# Patient Record
Sex: FEMALE | Race: WHITE | NOT HISPANIC OR LATINO | Employment: UNEMPLOYED | URBAN - METROPOLITAN AREA
[De-identification: names, ages, dates, MRNs, and addresses within clinical notes are randomized per-mention and may not be internally consistent; named-entity substitution may affect disease eponyms.]

---

## 2023-02-21 LAB
ALBUMIN (G/DL) IN SER/PLAS: 4.5 G/DL (ref 3.4–5)
ANION GAP IN SER/PLAS: 11 MMOL/L (ref 10–20)
CALCIDIOL (25 OH VITAMIN D3) (NG/ML) IN SER/PLAS: 28 NG/ML
CALCIUM (MG/DL) IN SER/PLAS: 10.4 MG/DL (ref 8.6–10.6)
CARBON DIOXIDE, TOTAL (MMOL/L) IN SER/PLAS: 28 MMOL/L (ref 21–32)
CHLORIDE (MMOL/L) IN SER/PLAS: 105 MMOL/L (ref 98–107)
CREATININE (MG/DL) IN SER/PLAS: 0.86 MG/DL (ref 0.5–1.05)
ERYTHROCYTE DISTRIBUTION WIDTH (RATIO) BY AUTOMATED COUNT: 13.1 % (ref 11.5–14.5)
ERYTHROCYTE MEAN CORPUSCULAR HEMOGLOBIN CONCENTRATION (G/DL) BY AUTOMATED: 32.1 G/DL (ref 32–36)
ERYTHROCYTE MEAN CORPUSCULAR VOLUME (FL) BY AUTOMATED COUNT: 89 FL (ref 80–100)
ERYTHROCYTES (10*6/UL) IN BLOOD BY AUTOMATED COUNT: 4.35 X10E12/L (ref 4–5.2)
FERRITIN (UG/LL) IN SER/PLAS: 20 UG/L (ref 8–150)
GFR FEMALE: 79 ML/MIN/1.73M2
GLUCOSE (MG/DL) IN SER/PLAS: 92 MG/DL (ref 74–99)
HEMATOCRIT (%) IN BLOOD BY AUTOMATED COUNT: 38.9 % (ref 36–46)
HEMOGLOBIN (G/DL) IN BLOOD: 12.5 G/DL (ref 12–16)
IRON (UG/DL) IN SER/PLAS: 51 UG/DL (ref 35–150)
IRON BINDING CAPACITY (UG/DL) IN SER/PLAS: 423 UG/DL (ref 240–445)
IRON SATURATION (%) IN SER/PLAS: 12 % (ref 25–45)
LEUKOCYTES (10*3/UL) IN BLOOD BY AUTOMATED COUNT: 5.6 X10E9/L (ref 4.4–11.3)
NRBC (PER 100 WBCS) BY AUTOMATED COUNT: 0 /100 WBC (ref 0–0)
PARATHYRIN INTACT (PG/ML) IN SER/PLAS: 88.3 PG/ML (ref 18.5–88)
PHOSPHATE (MG/DL) IN SER/PLAS: 3.4 MG/DL (ref 2.5–4.9)
PLATELETS (10*3/UL) IN BLOOD AUTOMATED COUNT: 317 X10E9/L (ref 150–450)
POTASSIUM (MMOL/L) IN SER/PLAS: 5.5 MMOL/L (ref 3.5–5.3)
SODIUM (MMOL/L) IN SER/PLAS: 138 MMOL/L (ref 136–145)
UREA NITROGEN (MG/DL) IN SER/PLAS: 12 MG/DL (ref 6–23)

## 2023-03-08 PROBLEM — J30.2 SEASONAL RHINITIS: Status: ACTIVE | Noted: 2023-03-08

## 2023-03-08 PROBLEM — E55.9 VITAMIN D DEFICIENCY: Status: ACTIVE | Noted: 2023-03-08

## 2023-03-08 PROBLEM — E83.52 HYPERCALCEMIA: Status: ACTIVE | Noted: 2023-03-08

## 2023-03-08 PROBLEM — R73.03 PREDIABETES: Status: ACTIVE | Noted: 2023-03-08

## 2023-03-08 PROBLEM — M25.569 KNEE PAIN: Status: ACTIVE | Noted: 2023-03-08

## 2023-03-08 PROBLEM — N39.3 STRESS INCONTINENCE, FEMALE: Status: ACTIVE | Noted: 2023-03-08

## 2023-03-08 PROBLEM — G89.29 CHRONIC NECK PAIN: Status: ACTIVE | Noted: 2023-03-08

## 2023-03-08 PROBLEM — L98.9 HAND LESION: Status: ACTIVE | Noted: 2023-03-08

## 2023-03-08 PROBLEM — E83.52 SERUM CALCIUM ELEVATED: Status: ACTIVE | Noted: 2023-03-08

## 2023-03-08 PROBLEM — M79.673 PAIN, FOOT, CHRONIC, UNSPECIFIED LATERALITY: Status: ACTIVE | Noted: 2023-03-08

## 2023-03-08 PROBLEM — S61.209A AVULSION OF SKIN OF FINGER: Status: ACTIVE | Noted: 2023-03-08

## 2023-03-08 PROBLEM — R09.A2 GLOBUS SENSATION: Status: ACTIVE | Noted: 2023-03-08

## 2023-03-08 PROBLEM — R21 FACIAL RASH: Status: ACTIVE | Noted: 2023-03-08

## 2023-03-08 PROBLEM — R10.2 FEMALE PELVIC PAIN: Status: ACTIVE | Noted: 2023-03-08

## 2023-03-08 PROBLEM — S90.02XA CONTUSION OF LEFT ANKLE: Status: ACTIVE | Noted: 2023-03-08

## 2023-03-08 PROBLEM — N90.89 VULVAR MASS: Status: ACTIVE | Noted: 2023-03-08

## 2023-03-08 PROBLEM — N95.1 HOT FLASHES, MENOPAUSAL: Status: ACTIVE | Noted: 2023-03-08

## 2023-03-08 PROBLEM — F41.9 ANXIETY: Status: ACTIVE | Noted: 2023-03-08

## 2023-03-08 PROBLEM — R14.0 BLOATING: Status: ACTIVE | Noted: 2023-03-08

## 2023-03-08 PROBLEM — J30.1 ALLERGIC RHINITIS DUE TO POLLEN: Status: ACTIVE | Noted: 2023-03-08

## 2023-03-08 PROBLEM — R68.2 DRY MOUTH, UNSPECIFIED: Status: ACTIVE | Noted: 2023-03-08

## 2023-03-08 PROBLEM — R53.83 FATIGUE: Status: ACTIVE | Noted: 2023-03-08

## 2023-03-08 PROBLEM — M99.01 SEGMENTAL AND SOMATIC DYSFUNCTION OF CERVICAL REGION: Status: ACTIVE | Noted: 2023-03-08

## 2023-03-08 PROBLEM — H93.8X2 SENSATION OF FULLNESS IN LEFT EAR: Status: ACTIVE | Noted: 2023-03-08

## 2023-03-08 PROBLEM — H00.015 HORDEOLUM EXTERNUM OF LEFT LOWER EYELID: Status: ACTIVE | Noted: 2023-03-08

## 2023-03-08 PROBLEM — R10.9 FLANK PAIN: Status: ACTIVE | Noted: 2023-03-08

## 2023-03-08 PROBLEM — M25.572 LEFT ANKLE PAIN: Status: ACTIVE | Noted: 2023-03-08

## 2023-03-08 PROBLEM — T14.8XXA WOUND INFECTION: Status: ACTIVE | Noted: 2023-03-08

## 2023-03-08 PROBLEM — G89.29 PAIN, FOOT, CHRONIC, UNSPECIFIED LATERALITY: Status: ACTIVE | Noted: 2023-03-08

## 2023-03-08 PROBLEM — N83.209 OVARIAN CYST: Status: ACTIVE | Noted: 2023-03-08

## 2023-03-08 PROBLEM — M54.2 CHRONIC NECK PAIN: Status: ACTIVE | Noted: 2023-03-08

## 2023-03-08 PROBLEM — B37.31 CANDIDA VAGINITIS: Status: ACTIVE | Noted: 2023-03-08

## 2023-03-08 PROBLEM — N93.9 ABNORMAL UTERINE BLEEDING: Status: ACTIVE | Noted: 2023-03-08

## 2023-03-08 PROBLEM — G47.00 INSOMNIA: Status: ACTIVE | Noted: 2023-03-08

## 2023-03-08 PROBLEM — H93.19 TINNITUS: Status: ACTIVE | Noted: 2023-03-08

## 2023-03-08 PROBLEM — N90.7 LABIAL CYST: Status: ACTIVE | Noted: 2023-03-08

## 2023-03-08 PROBLEM — K76.9 LIVER LESION: Status: ACTIVE | Noted: 2023-03-08

## 2023-03-08 PROBLEM — L08.9 WOUND INFECTION: Status: ACTIVE | Noted: 2023-03-08

## 2023-03-08 PROBLEM — M54.2 CERVICAL PAIN: Status: ACTIVE | Noted: 2023-03-08

## 2023-03-08 PROBLEM — H02.826: Status: ACTIVE | Noted: 2023-03-08

## 2023-03-08 PROBLEM — T78.1XXA POLLEN-FOOD ALLERGY SYNDROME: Status: ACTIVE | Noted: 2023-03-08

## 2023-03-08 PROBLEM — D50.9 IRON DEFICIENCY ANEMIA: Status: ACTIVE | Noted: 2023-03-08

## 2023-03-08 PROBLEM — K30 GASTROINTESTINAL DISCOMFORT: Status: ACTIVE | Noted: 2023-03-08

## 2023-03-08 PROBLEM — L98.9 SKIN LESION: Status: ACTIVE | Noted: 2023-03-08

## 2023-03-08 PROBLEM — N92.0 MENORRHAGIA: Status: ACTIVE | Noted: 2023-03-08

## 2023-03-08 PROBLEM — M85.80 OSTEOPENIA: Status: ACTIVE | Noted: 2023-03-08

## 2023-03-08 PROBLEM — K27.9 PEPTIC ULCER DISEASE: Status: ACTIVE | Noted: 2023-03-08

## 2023-03-08 PROBLEM — M62.838 MUSCLE SPASM: Status: ACTIVE | Noted: 2023-03-08

## 2023-03-08 PROBLEM — J30.89 ALLERGIC RHINITIS DUE TO DUST: Status: ACTIVE | Noted: 2023-03-08

## 2023-03-08 PROBLEM — H92.09 OTALGIA: Status: ACTIVE | Noted: 2023-03-08

## 2023-03-08 PROBLEM — F41.1 GENERALIZED ANXIETY DISORDER: Status: ACTIVE | Noted: 2023-03-08

## 2023-03-08 RX ORDER — ESTRADIOL 0.5 MG/.5G
0.5 GEL TOPICAL DAILY
COMMUNITY
End: 2023-12-13 | Stop reason: ALTCHOICE

## 2023-03-08 RX ORDER — FAMOTIDINE 20 MG/1
TABLET, FILM COATED ORAL
COMMUNITY
Start: 2020-03-30 | End: 2024-05-14

## 2023-03-08 RX ORDER — TRIAMCINOLONE ACETONIDE 0.25 MG/G
CREAM TOPICAL 2 TIMES DAILY
COMMUNITY
Start: 2019-02-01 | End: 2023-12-13 | Stop reason: ALTCHOICE

## 2023-03-08 RX ORDER — LORAZEPAM 0.5 MG/1
1-2 TABLET ORAL DAILY PRN
COMMUNITY
Start: 2022-04-12 | End: 2023-12-13 | Stop reason: ALTCHOICE

## 2023-03-08 RX ORDER — FLUTICASONE PROPIONATE 50 MCG
2 SPRAY, SUSPENSION (ML) NASAL DAILY
COMMUNITY
Start: 2016-07-05

## 2023-03-08 RX ORDER — PROGESTERONE 100 MG/1
1 CAPSULE ORAL NIGHTLY
COMMUNITY
Start: 2022-08-16 | End: 2023-12-13 | Stop reason: ALTCHOICE

## 2023-03-08 RX ORDER — ESCITALOPRAM OXALATE 5 MG/1
1 TABLET ORAL DAILY
COMMUNITY
Start: 2021-09-15 | End: 2023-10-04

## 2023-03-08 RX ORDER — ESCITALOPRAM OXALATE 10 MG/1
1 TABLET ORAL DAILY
COMMUNITY
End: 2023-09-27

## 2023-03-08 RX ORDER — DEXLANSOPRAZOLE 30 MG/1
1 CAPSULE, DELAYED RELEASE ORAL EVERY MORNING
COMMUNITY
Start: 2020-03-30 | End: 2023-09-28 | Stop reason: SDUPTHER

## 2023-03-08 RX ORDER — TRIAMCINOLONE ACETONIDE 5 MG/G
CREAM TOPICAL
COMMUNITY
Start: 2022-10-14 | End: 2023-12-13 | Stop reason: ALTCHOICE

## 2023-03-08 RX ORDER — CALCIUM CARBONATE 300MG(750)
TABLET,CHEWABLE ORAL
COMMUNITY

## 2023-03-10 ENCOUNTER — TELEMEDICINE (OUTPATIENT)
Dept: PRIMARY CARE | Facility: CLINIC | Age: 58
End: 2023-03-10
Payer: COMMERCIAL

## 2023-03-10 DIAGNOSIS — F41.9 ANXIETY: Primary | ICD-10-CM

## 2023-03-10 PROCEDURE — 99213 OFFICE O/P EST LOW 20 MIN: CPT | Performed by: INTERNAL MEDICINE

## 2023-03-10 RX ORDER — BUPROPION HYDROCHLORIDE 150 MG/1
150 TABLET ORAL EVERY MORNING
Qty: 30 TABLET | Refills: 2 | Status: SHIPPED | OUTPATIENT
Start: 2023-03-10 | End: 2023-04-02

## 2023-03-10 NOTE — PROGRESS NOTES
Chief complaint virtual fu     Bibiana Chatterjee is a 58 yo F presenting in virtual FU.     *Low energy with low normal iron levels 2.23 endo labs   -trial Slow Fe   -some concern may be more constipating; if occurs advised d/c     *Considering Jewel Ascencio meal plan   -rec'ed PGX (fiber supplement)     1. 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 Vasomotor symptoms of menopause   -saw gyne in 8.22 and started progesterone 100 mg qhs and estradiol patch     3. Dry mouth, grandmother had h/o SJogrens, did coincide with new start HRT   -will obtain autoantibodies   -reassurance     4. MDD, somewhat worse with daughters in [place], some worry   -Lexapro 5 mg will y  [ ]add wellbutrin 150 qam       #HM   -screen labs 5.22, screen lipid due   -mammo 11.17.22   -cscope 2.22       Gen well appearing nad

## 2023-04-01 DIAGNOSIS — F41.9 ANXIETY: ICD-10-CM

## 2023-04-02 RX ORDER — BUPROPION HYDROCHLORIDE 150 MG/1
150 TABLET ORAL EVERY MORNING
Qty: 30 TABLET | Refills: 2 | Status: SHIPPED | OUTPATIENT
Start: 2023-04-02 | End: 2023-05-10

## 2023-05-09 ENCOUNTER — TELEPHONE (OUTPATIENT)
Dept: PRIMARY CARE | Facility: CLINIC | Age: 58
End: 2023-05-09
Payer: COMMERCIAL

## 2023-05-09 DIAGNOSIS — F41.9 ANXIETY: ICD-10-CM

## 2023-05-10 RX ORDER — BUPROPION HYDROCHLORIDE 150 MG/1
150 TABLET ORAL EVERY MORNING
Qty: 90 TABLET | Refills: 1 | Status: SHIPPED | OUTPATIENT
Start: 2023-05-10 | End: 2023-10-20

## 2023-05-15 ENCOUNTER — TELEPHONE (OUTPATIENT)
Dept: PRIMARY CARE | Facility: CLINIC | Age: 58
End: 2023-05-15

## 2023-09-07 ENCOUNTER — TELEPHONE (OUTPATIENT)
Dept: PRIMARY CARE | Facility: CLINIC | Age: 58
End: 2023-09-07
Payer: COMMERCIAL

## 2023-09-07 NOTE — TELEPHONE ENCOUNTER
Patient said she has dry mouth really bad, she say she think its coming from wellbutrin, , so she went to urgent care, and they gave her a wash for thrush and told her if she taper off wellbutrin she should be better in a few days. She want to know what you think she should do, she takes wellbutrin now qod

## 2023-09-24 DIAGNOSIS — F41.9 ANXIETY DISORDER, UNSPECIFIED: ICD-10-CM

## 2023-09-27 RX ORDER — ESCITALOPRAM OXALATE 10 MG/1
10 TABLET ORAL DAILY
Qty: 90 TABLET | Refills: 0 | Status: SHIPPED | OUTPATIENT
Start: 2023-09-27 | End: 2023-11-30 | Stop reason: ALTCHOICE

## 2023-09-28 DIAGNOSIS — K30 GASTROINTESTINAL DISCOMFORT: ICD-10-CM

## 2023-09-28 DIAGNOSIS — K27.9 PEPTIC ULCER DISEASE: ICD-10-CM

## 2023-09-28 RX ORDER — DEXLANSOPRAZOLE 30 MG/1
1 CAPSULE, DELAYED RELEASE ORAL EVERY MORNING
Qty: 90 CAPSULE | Refills: 0 | Status: SHIPPED | OUTPATIENT
Start: 2023-09-28 | End: 2023-12-26

## 2023-10-04 ENCOUNTER — OFFICE VISIT (OUTPATIENT)
Dept: PRIMARY CARE | Facility: CLINIC | Age: 58
End: 2023-10-04
Payer: COMMERCIAL

## 2023-10-04 DIAGNOSIS — Z23 ENCOUNTER FOR HERPES ZOSTER VACCINATION: Primary | ICD-10-CM

## 2023-10-04 DIAGNOSIS — Z00.00 ROUTINE GENERAL MEDICAL EXAMINATION AT A HEALTH CARE FACILITY: ICD-10-CM

## 2023-10-04 DIAGNOSIS — Z12.31 BREAST CANCER SCREENING BY MAMMOGRAM: ICD-10-CM

## 2023-10-04 PROCEDURE — 90471 IMMUNIZATION ADMIN: CPT | Performed by: INTERNAL MEDICINE

## 2023-10-04 PROCEDURE — 90750 HZV VACC RECOMBINANT IM: CPT | Performed by: INTERNAL MEDICINE

## 2023-10-04 PROCEDURE — 99213 OFFICE O/P EST LOW 20 MIN: CPT | Performed by: INTERNAL MEDICINE

## 2023-10-04 NOTE — PATIENT INSTRUCTIONS
Bibiana     Call [phone] to schedule mammogram 11-17-23 or later.   Do lab work anytime this month fasting from midnight the night before (water, black coffee, tea - without milk/creamer or sugar) is normal.   Stop the wellbutrin, and give it 4 weeks then decide if dry mouth is better, or mood is worse, and let me know.   Last shingles shot of your life today!   See me back in 6 months for a physical :)     CL

## 2023-10-19 DIAGNOSIS — H92.09 OTALGIA, UNSPECIFIED LATERALITY: Primary | ICD-10-CM

## 2023-10-20 ENCOUNTER — LAB (OUTPATIENT)
Dept: LAB | Facility: LAB | Age: 58
End: 2023-10-20
Payer: COMMERCIAL

## 2023-10-20 DIAGNOSIS — Z00.00 ROUTINE GENERAL MEDICAL EXAMINATION AT A HEALTH CARE FACILITY: ICD-10-CM

## 2023-10-20 LAB
ALBUMIN SERPL BCP-MCNC: 4.4 G/DL (ref 3.4–5)
ALP SERPL-CCNC: 82 U/L (ref 33–110)
ALT SERPL W P-5'-P-CCNC: 12 U/L (ref 7–45)
ANION GAP SERPL CALC-SCNC: 13 MMOL/L (ref 10–20)
AST SERPL W P-5'-P-CCNC: 15 U/L (ref 9–39)
BILIRUB SERPL-MCNC: 0.5 MG/DL (ref 0–1.2)
BUN SERPL-MCNC: 14 MG/DL (ref 6–23)
CALCIUM SERPL-MCNC: 10.8 MG/DL (ref 8.6–10.6)
CHLORIDE SERPL-SCNC: 105 MMOL/L (ref 98–107)
CHOLEST SERPL-MCNC: 241 MG/DL (ref 0–199)
CHOLESTEROL/HDL RATIO: 3
CO2 SERPL-SCNC: 28 MMOL/L (ref 21–32)
CREAT SERPL-MCNC: 0.85 MG/DL (ref 0.5–1.05)
ERYTHROCYTE [DISTWIDTH] IN BLOOD BY AUTOMATED COUNT: 12.1 % (ref 11.5–14.5)
GFR SERPL CREATININE-BSD FRML MDRD: 81 ML/MIN/1.73M*2
GLUCOSE SERPL-MCNC: 79 MG/DL (ref 74–99)
HCT VFR BLD AUTO: 40.2 % (ref 36–46)
HDLC SERPL-MCNC: 79.1 MG/DL
HGB BLD-MCNC: 13 G/DL (ref 12–16)
IRON SATN MFR SERPL: 26 % (ref 25–45)
IRON SERPL-MCNC: 100 UG/DL (ref 35–150)
LDLC SERPL CALC-MCNC: 151 MG/DL
MCH RBC QN AUTO: 29.8 PG (ref 26–34)
MCHC RBC AUTO-ENTMCNC: 32.3 G/DL (ref 32–36)
MCV RBC AUTO: 92 FL (ref 80–100)
NON HDL CHOLESTEROL: 162 MG/DL (ref 0–149)
NRBC BLD-RTO: 0 /100 WBCS (ref 0–0)
PLATELET # BLD AUTO: 328 X10*3/UL (ref 150–450)
PMV BLD AUTO: 10.6 FL (ref 7.5–11.5)
POTASSIUM SERPL-SCNC: 5 MMOL/L (ref 3.5–5.3)
PROT SERPL-MCNC: 6.8 G/DL (ref 6.4–8.2)
RBC # BLD AUTO: 4.36 X10*6/UL (ref 4–5.2)
SODIUM SERPL-SCNC: 141 MMOL/L (ref 136–145)
TIBC SERPL-MCNC: 381 UG/DL (ref 240–445)
TRIGL SERPL-MCNC: 57 MG/DL (ref 0–149)
UIBC SERPL-MCNC: 281 UG/DL (ref 110–370)
VLDL: 11 MG/DL (ref 0–40)
WBC # BLD AUTO: 6.4 X10*3/UL (ref 4.4–11.3)

## 2023-10-20 PROCEDURE — 83550 IRON BINDING TEST: CPT

## 2023-10-20 PROCEDURE — 83540 ASSAY OF IRON: CPT

## 2023-10-20 PROCEDURE — 36415 COLL VENOUS BLD VENIPUNCTURE: CPT

## 2023-10-20 PROCEDURE — 80053 COMPREHEN METABOLIC PANEL: CPT

## 2023-10-20 PROCEDURE — 85027 COMPLETE CBC AUTOMATED: CPT

## 2023-10-20 PROCEDURE — 80061 LIPID PANEL: CPT

## 2023-10-27 ENCOUNTER — APPOINTMENT (OUTPATIENT)
Dept: RADIOLOGY | Facility: CLINIC | Age: 58
End: 2023-10-27
Payer: COMMERCIAL

## 2023-10-30 DIAGNOSIS — H92.09 OTALGIA, UNSPECIFIED LATERALITY: Primary | ICD-10-CM

## 2023-10-31 ENCOUNTER — LAB (OUTPATIENT)
Dept: LAB | Facility: LAB | Age: 58
End: 2023-10-31
Payer: COMMERCIAL

## 2023-10-31 DIAGNOSIS — R51.9 HEADACHE, UNSPECIFIED: Primary | ICD-10-CM

## 2023-10-31 LAB — ERYTHROCYTE [SEDIMENTATION RATE] IN BLOOD BY WESTERGREN METHOD: 5 MM/H (ref 0–30)

## 2023-10-31 PROCEDURE — 36415 COLL VENOUS BLD VENIPUNCTURE: CPT

## 2023-10-31 PROCEDURE — 85652 RBC SED RATE AUTOMATED: CPT

## 2023-11-07 ENCOUNTER — APPOINTMENT (OUTPATIENT)
Dept: OTOLARYNGOLOGY | Facility: CLINIC | Age: 58
End: 2023-11-07
Payer: COMMERCIAL

## 2023-11-13 ENCOUNTER — ANCILLARY PROCEDURE (OUTPATIENT)
Dept: RADIOLOGY | Facility: CLINIC | Age: 58
End: 2023-11-13
Payer: COMMERCIAL

## 2023-11-13 ENCOUNTER — APPOINTMENT (OUTPATIENT)
Dept: RADIOLOGY | Facility: CLINIC | Age: 58
End: 2023-11-13
Payer: COMMERCIAL

## 2023-11-13 DIAGNOSIS — H92.09 OTALGIA, UNSPECIFIED LATERALITY: ICD-10-CM

## 2023-11-13 PROCEDURE — 70551 MRI BRAIN STEM W/O DYE: CPT

## 2023-11-16 ENCOUNTER — APPOINTMENT (OUTPATIENT)
Dept: RADIOLOGY | Facility: CLINIC | Age: 58
End: 2023-11-16
Payer: COMMERCIAL

## 2023-11-16 ENCOUNTER — OFFICE VISIT (OUTPATIENT)
Dept: OTOLARYNGOLOGY | Facility: CLINIC | Age: 58
End: 2023-11-16
Payer: COMMERCIAL

## 2023-11-16 VITALS — WEIGHT: 152 LBS | BODY MASS INDEX: 27.8 KG/M2

## 2023-11-16 DIAGNOSIS — M26.609 TEMPOROMANDIBULAR JOINT DISORDER: Primary | ICD-10-CM

## 2023-11-16 DIAGNOSIS — H69.90 DYSFUNCTION OF EUSTACHIAN TUBE, UNSPECIFIED LATERALITY: ICD-10-CM

## 2023-11-16 PROCEDURE — 99214 OFFICE O/P EST MOD 30 MIN: CPT | Performed by: GENERAL PRACTICE

## 2023-11-16 PROCEDURE — 1036F TOBACCO NON-USER: CPT | Performed by: GENERAL PRACTICE

## 2023-11-16 ASSESSMENT — PATIENT HEALTH QUESTIONNAIRE - PHQ9
2. FEELING DOWN, DEPRESSED OR HOPELESS: NOT AT ALL
1. LITTLE INTEREST OR PLEASURE IN DOING THINGS: NOT AT ALL
SUM OF ALL RESPONSES TO PHQ9 QUESTIONS 1 AND 2: 0

## 2023-11-17 ENCOUNTER — APPOINTMENT (OUTPATIENT)
Dept: RADIOLOGY | Facility: CLINIC | Age: 58
End: 2023-11-17
Payer: COMMERCIAL

## 2023-11-17 RX ORDER — GUAIFENESIN, PSEUDOEPHEDRINE HYDROCHLORIDE 600; 60 MG/1; MG/1
1 TABLET, EXTENDED RELEASE ORAL EVERY 12 HOURS
Qty: 60 TABLET | Refills: 2 | Status: SHIPPED | OUTPATIENT
Start: 2023-11-17 | End: 2024-03-05 | Stop reason: ALTCHOICE

## 2023-11-17 RX ORDER — FLUTICASONE PROPIONATE 50 MCG
2 SPRAY, SUSPENSION (ML) NASAL DAILY
Qty: 16 G | Refills: 2 | Status: SHIPPED | OUTPATIENT
Start: 2023-11-17 | End: 2023-12-12

## 2023-11-17 NOTE — PROGRESS NOTES
Otolaryngology - Head and Neck Surgery Outpatient New Patient Visit Note        Assessment/Plan   Problem List Items Addressed This Visit    None  Visit Diagnoses         Codes    Temporomandibular joint disorder    -  Primary M26.609    Dysfunction of Eustachian tube, unspecified laterality     H69.90    Relevant Medications    fluticasone (Flonase) 50 mcg/actuation nasal spray    pseudoephedrine-guaifenesin (Mucinex D)  mg 12 hr tablet          Recent L temporal headache as well as aural fullness without concerning findings on recent MRI or exam.  Suspect combination of ETD as well as TMJ contributing to symptoms.    Discussed use of flonase and mucinexD to aid in ETD management.   Discussed conservative management of TMJ.         The etiology of temporomandibular joint disorders (TMD) was discussed in detail with patient including: structural issues such as alterations in dental occlusion (the alignment of the upper and lower teeth) that affect maxillomandibular position and function. These issues may or may not be associated with joint trauma, poor posture or cervicogenic etiologies.  Possible psychologic factors include anxiety, depression and PTSD. Multiple other contributing and comorbid factors, including biological, behavioral, environmental, and cognitive disorders which can all contribute to the development of symptoms were also reviewed with the patient.      PLAN:  1) Lifestyle interventions for management of TMJ dysfunction were discussed with the patient today including use of warm compresses, massage of the TMJ joint, and/or use of NSAIDS PRN for pain and intimation and soft chew diet.      Additional options for further evaluation and management of TMD were discussed with the patient today and may include the following referrals upon patient request:  1) Referral to physical therapy for further evaluation and management of cervicogenic/ postural related issues.  2) Referral to [facility] for evaluation and management with alternative therapeutic modalities such as massage or acupuncture.   3) Referral to dental medicine for further evaluation and management of structural/ mechanical concerns such as malocclusion or bruxism.         Follow up:  -plan for follow up in clinic as needed    All of the above findings, impressions, treatment planning and follow up plans were discussed with the patient who indicated understanding.  the patient was instructed to contact or return to clinic sooner if symptoms/signs persist or worsen despite the above management.      Haim Barclay MD  Otolaryngology - Head and Neck Surgery            History Of Present Illness  Bibiana Chatterjee is a 58 y.o. female presenting for concerns of recent L sided headache (temporal) as well as L>R ear fullness.      Reports bothersome headache presents for several weeks.  Waxing/waning but chronic.   No similar prior symptoms.  Mild TTP in left temporal region. Tension in jaw.     Reports aural fullness and mild ongoing tinnitus without otalgia, otorrhea.    Notes some history of allergic rhinitis but no current significant symmptoms    Notes some history of TMJ, and had braces placed in recent months.                Past Medical History  She has a past medical history of Chronic sinusitis, unspecified (03/29/2019), Menopausal and female climacteric states (08/16/2022), Personal history of other diseases of the respiratory system (12/29/2015), Personal history of other diseases of the respiratory system (01/07/2014), Personal history of other mental and behavioral disorders (08/04/2017), and Unspecified open wound of unspecified finger without damage to nail, initial encounter (11/29/2013).    Surgical History  She has a past surgical history that includes Other surgical history (03/30/2020).     Social History  She reports that she has never smoked. She has never used smokeless tobacco. No history on file for alcohol  use and drug use.    Family History  Family History   Problem Relation Name Age of Onset    Other (ALLERGIC DISORDER) Mother      Crohn's disease Father          Allergies  Iodinated contrast media    Review of Systems  ROS: Pertinent positives as noted in HPI.    - CONSTITUTIONAL: Does not report weight loss, fever or chills.    - HEENT:   Ear: Does not report tinnitus, vertigo,    ,  , otorrhea  Nose: Does not report congestion, rhinorrhea, epistaxis, decreased smell  Throat: Does not report pain, dysphagia, odynophagia  Larynx: Does not report hoarseness,  difficulty breathing, pain with speaking (odynophonia)  Neck: Does not report new masses, pain, swelling  Face: Does not report sinus pain, pressure, swelling, numbness, weakness     - RESPIRATORY: Does not report SOB or cough.    - CV: Does not report palpitations or chest pain.     - GI: Does not report abdominal pain, nausea, vomiting or diarrhea.    - : Does not report dysuria or urinary frequency.    - MSK: Does not report myalgia or joint pain.    - SKIN: Does not report rash or pruritus.    - NEUROLOGICAL: Does not report headache or syncope.    - PSYCHIATRIC: Does not report recent changes in mood. Does not report anxiety or depression.         Physical Exam:     GENERAL:   Alert & Oriented to person, place and time; Normal affect and appearance. Well developed and well nourished. Conversant & cooperative with examination.     HEAD:   Normocephalic, atraumatic. No sinus tenderness to palpation. Normal parotid bilaterally. Normal facial strength.     NEUROLOGIC:   Cranial nerves II-XII grossly intact, gait WNL. Normal mood and affect.    EYES:   Extraocular movements intact. Pupils equal, round, reactive to light and accommodation. No nystagmus, no ptosis. no scleral injection.    EAR:   Normal auricle. No discomfort or TTP with manipulation.   Handheld otoscopic exam showed normal external auditory canals bilaterally. No purulence or EAC inflammation.  Minimal cerumen.   Right tympanic membrane clear and mobile without evidence of perforation, retraction or middle ear effusion.   Left tympanic membrane clear and poorly mobile without evidence of perforation, retraction or middle ear effusion.   Grigsby midline and AC>BC b/l with 512hz     NOSE:   No external deformity. No external nasal lesions, lacerations, or scars. Nasal tip symmetrical with normal nasal valves.   Nasal cavity with essentially midline septum, normal mucosa and turbinates. No lesions, masses, purulence or polyps.     OC/OP:   Mucous membranes moist, no masses, lesions or exudates.   Normal tongue, floor of mouth, teeth, gums, lips. Normal posterior pharyngeal wall.    Normal tonsils without erythema, exudate or obvious calculi     NECK:   No neck masses or thyroid enlargement. Trachea midline. No tenderness to palpation    LYMPHATIC:   No cervical lymphadenopathy.     RESPIRATORY:   Symmetric chest elevation & no retractions. No significant hoarseness. No increased work of breathing.    CV:   No clubbing or cyanosis. No obvious edema    Skin:   No facial rashes, vesicles or lesions.     Extremities:   No gross abnormalities      Clinic Procedure        Information review:  External sources (notes, imaging, lab results) listed below personally reviewed to aid in medical decision making process.  -PCM note 10/4/23 Yolanda  -MRI IAC 11/13/23  -PCM note 3/10/23

## 2023-11-30 ENCOUNTER — TELEPHONE (OUTPATIENT)
Dept: PRIMARY CARE | Facility: CLINIC | Age: 58
End: 2023-11-30
Payer: COMMERCIAL

## 2023-11-30 DIAGNOSIS — F41.9 ANXIETY: Primary | ICD-10-CM

## 2023-11-30 RX ORDER — ESCITALOPRAM OXALATE 5 MG/1
5 TABLET ORAL DAILY
Qty: 90 TABLET | Refills: 0 | Status: SHIPPED | OUTPATIENT
Start: 2023-11-30 | End: 2023-12-13 | Stop reason: ALTCHOICE

## 2023-12-01 ENCOUNTER — ANCILLARY PROCEDURE (OUTPATIENT)
Dept: RADIOLOGY | Facility: CLINIC | Age: 58
End: 2023-12-01
Payer: COMMERCIAL

## 2023-12-01 DIAGNOSIS — Z12.31 BREAST CANCER SCREENING BY MAMMOGRAM: ICD-10-CM

## 2023-12-05 ENCOUNTER — TELEPHONE (OUTPATIENT)
Dept: PRIMARY CARE | Facility: CLINIC | Age: 58
End: 2023-12-05
Payer: COMMERCIAL

## 2023-12-05 DIAGNOSIS — R73.09 ABNORMAL BLOOD SUGAR: Primary | ICD-10-CM

## 2023-12-12 DIAGNOSIS — H69.90 DYSFUNCTION OF EUSTACHIAN TUBE, UNSPECIFIED LATERALITY: ICD-10-CM

## 2023-12-12 RX ORDER — FLUTICASONE PROPIONATE 50 MCG
2 SPRAY, SUSPENSION (ML) NASAL DAILY
Qty: 48 ML | Refills: 1 | Status: SHIPPED | OUTPATIENT
Start: 2023-12-12 | End: 2024-12-11

## 2023-12-12 NOTE — PROGRESS NOTES
Subjective   Bibiana Chatterjee is a 58 y.o. female who is here for a routine gynecologic exam.     Concerns: Pt endorses a bump on the vagina that is not itchy or painful.   No LMP recorded (lmp unknown). Patient is postmenopausal.    Last pap:   History of abnormal Pap smear: no   Last mammo:   History of abnormal mammogram: no    HPV vaccination: No     Social History     Substance and Sexual Activity   Sexual Activity Not Currently    Partners: Male     Hx of STIs: none  OB History          3    Para   3    Term   3            AB        Living   3         SAB        IAB        Ectopic        Multiple        Live Births   3               Past Medical History:   Diagnosis Date    Chronic sinusitis, unspecified 2019    Viral sinusitis    Menopausal and female climacteric states 2022    Perimenopause    Personal history of other diseases of the respiratory system 2015    History of asthma    Personal history of other mental and behavioral disorders 2017    History of anxiety disorder     Past Surgical History:   Procedure Laterality Date    OTHER SURGICAL HISTORY  2020    Rhinoplasty     Family History   Problem Relation Name Age of Onset    Crohn's disease Father         SoHx:  Vaginal hygiene: having vaginal   Loss of sexual desire: No  Pain with sex: No  Able to orgasm: No   Living Situation: lives with partner   Safe at home: Yes   School/Employment: yes   Exercise: No   Substance:   Tobacco Use: Low Risk  (2023)    Patient History     Smoking Tobacco Use: Never     Smokeless Tobacco Use: Never     Passive Exposure: Not on file      Social History     Substance and Sexual Activity   Drug Use Not on file      Social History     Substance and Sexual Activity   Alcohol Use None       Review of Systems   Constitutional: Negative.    Respiratory: Negative.     Cardiovascular: Negative.    Gastrointestinal: Negative.    Genitourinary: Negative.    Neurological: Negative.  "   Psychiatric/Behavioral: Negative.         Objective   /70   Pulse 85   Ht 1.626 m (5' 4\")   Wt 73.5 kg (162 lb 1.6 oz)   LMP  (LMP Unknown)   BMI 27.82 kg/m²     Physical Exam  Constitutional:       Appearance: Normal appearance.   HENT:      Head: Normocephalic.   Cardiovascular:      Rate and Rhythm: Normal rate and regular rhythm.   Pulmonary:      Effort: Pulmonary effort is normal.      Breath sounds: Normal breath sounds.   Chest:   Breasts:     Right: No mass.      Left: No mass.   Abdominal:      General: Abdomen is flat.   Genitourinary:     Vagina: Normal.      Cervix: Normal.          Comments: Bump noted on the left superior clitoral area   Serous discharge noted on expression   Neurological:      Mental Status: She is alert.         Assessment/Plan   Problem List Items Addressed This Visit       Encounter for gynecological examination - Primary    Overview     Pt notes that she already has a mammogram scheduled          Relevant Orders    THINPREP PAP TEST    Follicle cyst    Overview     Warm compress              Plan:   -discussed about breast self awareness  -encouraged healthy eating, exercise recommendations based of off  min of moderate intensity exercise a week  -RTC in 1  year for next annual or prn    FLORENCIO Baker-DYLON    "

## 2023-12-13 ENCOUNTER — OFFICE VISIT (OUTPATIENT)
Dept: OBSTETRICS AND GYNECOLOGY | Facility: CLINIC | Age: 58
End: 2023-12-13
Payer: COMMERCIAL

## 2023-12-13 VITALS
DIASTOLIC BLOOD PRESSURE: 70 MMHG | SYSTOLIC BLOOD PRESSURE: 102 MMHG | HEIGHT: 64 IN | BODY MASS INDEX: 27.68 KG/M2 | HEART RATE: 85 BPM | WEIGHT: 162.1 LBS

## 2023-12-13 DIAGNOSIS — N83.00 FOLLICLE CYST: ICD-10-CM

## 2023-12-13 DIAGNOSIS — Z01.419 ENCOUNTER FOR GYNECOLOGICAL EXAMINATION: Primary | ICD-10-CM

## 2023-12-13 PROBLEM — N90.7 VULVAR CYST: Status: ACTIVE | Noted: 2023-12-13

## 2023-12-13 PROCEDURE — 1036F TOBACCO NON-USER: CPT | Performed by: DOULA

## 2023-12-13 PROCEDURE — 99396 PREV VISIT EST AGE 40-64: CPT | Performed by: DOULA

## 2023-12-13 PROCEDURE — 88175 CYTOPATH C/V AUTO FLUID REDO: CPT | Mod: TC,GCY | Performed by: DOULA

## 2023-12-13 PROCEDURE — 87624 HPV HI-RISK TYP POOLED RSLT: CPT | Performed by: DOULA

## 2023-12-13 PROCEDURE — 88141 CYTOPATH C/V INTERPRET: CPT | Performed by: PATHOLOGY

## 2023-12-13 ASSESSMENT — PAIN SCALES - GENERAL: PAINLEVEL: 0-NO PAIN

## 2023-12-13 ASSESSMENT — ENCOUNTER SYMPTOMS
RESPIRATORY NEGATIVE: 1
CARDIOVASCULAR NEGATIVE: 1
GASTROINTESTINAL NEGATIVE: 1
CONSTITUTIONAL NEGATIVE: 1
NEUROLOGICAL NEGATIVE: 1
PSYCHIATRIC NEGATIVE: 1

## 2023-12-24 DIAGNOSIS — K30 GASTROINTESTINAL DISCOMFORT: ICD-10-CM

## 2023-12-24 DIAGNOSIS — K27.9 PEPTIC ULCER DISEASE: ICD-10-CM

## 2023-12-26 RX ORDER — DEXLANSOPRAZOLE 30 MG/1
1 CAPSULE, DELAYED RELEASE ORAL DAILY
Qty: 90 CAPSULE | Refills: 0 | Status: SHIPPED | OUTPATIENT
Start: 2023-12-26 | End: 2024-01-26

## 2024-01-05 ENCOUNTER — ANCILLARY PROCEDURE (OUTPATIENT)
Dept: RADIOLOGY | Facility: CLINIC | Age: 59
End: 2024-01-05
Payer: COMMERCIAL

## 2024-01-05 ENCOUNTER — LAB (OUTPATIENT)
Dept: LAB | Facility: LAB | Age: 59
End: 2024-01-05
Payer: COMMERCIAL

## 2024-01-05 DIAGNOSIS — R73.09 ABNORMAL BLOOD SUGAR: ICD-10-CM

## 2024-01-05 LAB
EST. AVERAGE GLUCOSE BLD GHB EST-MCNC: 117 MG/DL
HBA1C MFR BLD: 5.7 %

## 2024-01-05 PROCEDURE — 77067 SCR MAMMO BI INCL CAD: CPT | Mod: BILATERAL PROCEDURE | Performed by: STUDENT IN AN ORGANIZED HEALTH CARE EDUCATION/TRAINING PROGRAM

## 2024-01-05 PROCEDURE — 77063 BREAST TOMOSYNTHESIS BI: CPT

## 2024-01-05 PROCEDURE — 36415 COLL VENOUS BLD VENIPUNCTURE: CPT

## 2024-01-05 PROCEDURE — 77063 BREAST TOMOSYNTHESIS BI: CPT | Mod: BILATERAL PROCEDURE | Performed by: STUDENT IN AN ORGANIZED HEALTH CARE EDUCATION/TRAINING PROGRAM

## 2024-01-05 PROCEDURE — 83036 HEMOGLOBIN GLYCOSYLATED A1C: CPT

## 2024-01-08 LAB
CYTOLOGY CMNT CVX/VAG CYTO-IMP: NORMAL
HPV HR 12 DNA GENITAL QL NAA+PROBE: NEGATIVE
HPV HR GENOTYPES PNL CVX NAA+PROBE: NEGATIVE
HPV16 DNA SPEC QL NAA+PROBE: NEGATIVE
HPV18 DNA SPEC QL NAA+PROBE: NEGATIVE
LAB AP HPV GENOTYPE QUESTION: YES
LAB AP HPV HR: NORMAL
LABORATORY COMMENT REPORT: NORMAL
PATH REPORT.TOTAL CANCER: NORMAL

## 2024-01-09 DIAGNOSIS — R73.03 PREDIABETES: Primary | ICD-10-CM

## 2024-01-09 PROBLEM — R87.610 ATYPICAL SQUAMOUS CELL CHANGES OF UNDETERMINED SIGNIFICANCE (ASCUS) ON CERVICAL CYTOLOGY WITH NEGATIVE HIGH RISK HUMAN PAPILLOMA VIRUS (HPV) TEST RESULT: Status: ACTIVE | Noted: 2024-01-09

## 2024-01-15 ENCOUNTER — TELEPHONE (OUTPATIENT)
Dept: ENDOCRINOLOGY | Facility: CLINIC | Age: 59
End: 2024-01-15
Payer: COMMERCIAL

## 2024-01-15 DIAGNOSIS — E83.52 HYPERCALCEMIA: Primary | ICD-10-CM

## 2024-01-18 ENCOUNTER — TELEMEDICINE CLINICAL SUPPORT (OUTPATIENT)
Dept: NUTRITION | Facility: CLINIC | Age: 59
End: 2024-01-18
Payer: COMMERCIAL

## 2024-01-18 VITALS — HEIGHT: 64 IN | BODY MASS INDEX: 27.82 KG/M2

## 2024-01-18 DIAGNOSIS — R73.03 PREDIABETES: Primary | ICD-10-CM

## 2024-01-18 DIAGNOSIS — Z71.3 DIETARY COUNSELING: ICD-10-CM

## 2024-01-18 PROCEDURE — 97802 MEDICAL NUTRITION INDIV IN: CPT | Performed by: DIETITIAN, REGISTERED

## 2024-01-18 PROCEDURE — 97802 MEDICAL NUTRITION INDIV IN: CPT | Mod: 95 | Performed by: DIETITIAN, REGISTERED

## 2024-01-18 NOTE — PROGRESS NOTES
Reason for Nutrition Visit:  Pt is a 58 y.o. female being seen at remotely referred for   1. Prediabetes  Referral to Nutrition Services      2. Dietary counseling           Nutrition Assessment      Past Medical Hx:  Patient Active Problem List   Diagnosis    Abnormal uterine bleeding    Anxiety    Avulsion of skin of finger    Candida vaginitis    Cervical pain    Chronic neck pain    Contusion of left ankle    Dry mouth, unspecified    Epidermal inclusion cyst of left eyelid    Skin lesion    Facial rash    Serum calcium elevated    Hypercalcemia    Hot flashes, menopausal    Hordeolum externum of left lower eyelid    Hand lesion    Globus sensation    Generalized anxiety disorder    Gastrointestinal discomfort    Flank pain    Fatigue    Ovarian cyst    Muscle spasm    Menorrhagia    Liver lesion    Labial cyst    Knee pain    Iron deficiency anemia    Insomnia    Left ankle pain    Pain, foot, chronic, unspecified laterality    Female pelvic pain    Peptic ulcer disease    Pollen-food allergy syndrome    Prediabetes    Allergic rhinitis due to dust    Allergic rhinitis due to pollen    Seasonal rhinitis    Segmental and somatic dysfunction of cervical region    Stress incontinence, female    Otalgia    Sensation of fullness in left ear    Tinnitus    Wound infection    Vulvar mass    Vitamin D deficiency    Osteopenia    Encounter for gynecological examination    Follicle cyst    Atypical squamous cell changes of undetermined significance (ASCUS) on cervical cytology with negative high risk human papilloma virus (HPV) test result      Lab Results   Component Value Date    HGBA1C 5.7 (H) 01/05/2024    HGBA1C 5.8 02/01/2019    CHOL 241 (H) 10/20/2023    LDLCALC 151 (H) 10/20/2023    TRIG 57 10/20/2023    HDL 79.1 10/20/2023      Food and Nutrient History: Pt is interested in weight management strategies. She mentions issues with constipation and trying different supplements like magnesium to see if it helps, but  notes the newest magnesium supplement hasn't been helping. She also takes probiotics and iron. She was also notified that her HgA1c is borderline prediabetes and she would like to bring this down. She mostly fasts until 11/11:30am most days and her other meal times can be sporadic due to her work schedule. She talks about guilt with certain foods like oatmeal or cheese and eating those at times. She is vegeterian and follows a kosher diet. 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     Dietary Recall:  Meal 1: drank 2-3 cups of tea and water  Meal 2: 1/2 grapefruit  and cheese and crackers mid-morning  Meal 3: ground plant based crumbles with zucchini, baked sweet potato with roasted eggplant  Snacks: 10am 3 bites of greek yogurt with cinnamon; Halotop ice cream after dinner  Fluid Intake: mostly water, hot tea, 2 cups of coffee, no etoh    Appetite: Normal     Food Allergy: n/a  Food Intolerance: n/a  GI Symptoms: constipation, increased gas, bloating GI Symptoms greater than 2 weeks: yes  Oral Problems:  (sometimes soup hard to swallow)  Dentition: own (wears braces which makes chewing sometimes hard with foods like salad)  Sleep Duration/Quality: 7+ hrs continuous  Cooking: Patient, Spouse/Significant Other  Grocery Shopping: Patient, Spouse/Significant Other  Dining Out: 1 to 3 times a week    Vitamin Intake: D Mineral/Element Intake: Iron, Magnesium Nutrition-Related Complementary/Alternative Medicine Use: Probiotics    Physical Activity History: Mostly sedentary, but trying to do more walking or workouts at home like yoga once per week     Food Preparation  Cooking: Patient, Spouse/Significant Other  Grocery Shopping: Patient, Spouse/Significant Other  Dining Out: 1 to 3 times a week    Feelings of Hunger?: Yes and will eat  Physical Feeling: Physically full  Binging: Never  Cravings: Sweet  Energy Levels: Stable    Nutrition Focused Physical Exam:    Performed/Deferred: Deferred due to  be being virtual visit    Muscle Wasting:  Temporalis: Defer  Pectoralis (Clavicular Region): Defer  Deltoid/Trapezius: Defer  Interosseous: Defer  Quadriceps: Defer    Loss of Subcutaneous Fat:  Orbital Fat Pads: Defer  Buccal Fat Pads: Defer  Triceps: Defer  Ribs: Defer    Other Physical Findings:  Hair: Negative  Eyes: Negative  Mouth: Negative  Skin: Negative  Nails: Negative    Estimated Energy Needs:    Total Energy Estimated Needs (kCal): 1422 kCal   Method for Estimating Needs: MSJ: 1310x1.2-150   Total Protein Estimated Needs (g): 58.91 g   Total Protein Estimated Needs (g/kg): 0.8 g/kg  Nutrition Diagnosis     Patient has Nutrition Diagnosis: Yes Diagnosis Status (1): New  Nutrition Diagnosis 1: Altered nutrition related to laboratory values Related to (1): endocrine and metabolic dysfunction As Evidenced by (1): HgA1c of 5.7%, high cholesterol (241) and high LDL (151) on 10/20/2023.     Diagnosis Status (2): New Nutrition Diagnosis 2: Food and nutrition related knowledge deficit  Nutrition Diagnosis 2: Food and nutrition related knowledge deficit Related to (2): lack of or limited prior nutrition-related education As Evidenced by (2): no prior knowledge of need for food- and nutrition-related recommendations.     Diagnosis Status (3): New Nutrition Diagnosis 3: Inadequate fiber intake  Nutrition Diagnosis 3: Inadequate fiber intake Related to (3): food and nutrition related knowledge deficit concerning desirable quantities of fiber As Evidenced by (3): estimated intake of fiber that is insufficient when compared to recommended amounts (38 g/day for men and 25 g/day for women).     Nutrition Interventions/Recommendations   Individualized Nutrition Prescription Provided for : Increased protein, fiber, omega-3 fats, and complex carbohydrates with reduced sodium, saturated fat and sodium  Meals & Snacks: Fiber-modified diet, Protein-modified diet, Modify Composition of Meals/Snacks  Goals: Consistent  "meal/snack pattern with adequate intake of protein and fiber    Strategies: Nutrition counseling based on motivational interviewing strategy, Nutrition counseling based on goal setting strategy    Nutrition Monitoring and Evaluation   Monitoring and Evaluation Plan: Meal/snack pattern, Protein intake Meal/Snack Pattern: Estimated meal and snack pattern, Food variety  Monitoring and Evaluation Plan: Food and nutrition knowledge Criteria: Ability to choose healthful foods that increase satiety and physical fullness    Nutrition Recommendations:  Via teach back method patient verbalized understanding of the following topics:  1) Discussed importance of eating a high protein, fiber and healthy fat breakfast. Suggested two options: 3/4 cup of greek yogurt with 1/4 cup of fruit (I.e berries), 1 tablespoon of nuts, drizzle of honey, and 2 teaspoons of seeds of your choice (brisa, flax or hemp seeds) or 2 egg muffins with 1/3 of an avocado, and 1 piece or 1 cup of fruit of your choice.  2) Emphasized focusing on balanced meals and reducing \"grazing\" or regular snacking throughout the day. If you do snack, add more protein to the snacks. Suggested pairing a 1/2 cup of nuts with 3-6 crackers and 1 oz of cheese for a snack.  3) Encouraged including more healthy fats such as brisa, flax or hemp seeds, avocado, nuts (variety of pecans, almonds, pistachios, brazil nuts, macadamia nuts, etc), extra virgin olive oil, and nut butters.    Educational Handouts: Greek Yogurt Bowl, Mini Egg Frittata, Healthy Dietary Fats    Readiness to Change : Fair  Level of Understanding : Fair  Anticipated Compliant : Fair    "

## 2024-01-18 NOTE — PATIENT INSTRUCTIONS
1) Consider following a Mediterranean Diet approach to help improve health and reduce risk/manage chronic disease. Focus on eating more unprocessed foods including fruits, vegetables, whole grains, legumes, nuts, fish, poultry, eggs, low fat cheese and dairy (cottage cheese, greek yogurt, and kefir). Include variety and color in your diet with fresh or frozen varieties of fruits and vegetables regularly included at your meals.  2) Decrease intake of processed foods with added fats, sugar, and salt. Reduce salt or sodium intake to less than 1500mg of sodium per day. Instead of adding table salt for flavor, use more herbs (basil, thyme, mint, parsley, cilantro, and dill), seasonings (cinnamon, cloves, oregano, fennel seed, and chili powder) and other flavors (gallo, garlic, and turmeric) when cooking. Use vegetable oil such as olive, canola, safflower, soybean and corn instead of butter, lard, or whole-fat dairy sources when cooking.  3) Eat less red meat including lean beef, pork or lamb to only a few times per month. Lean poultry including skinless chicken and turkey can be eaten a few times per week with a serving being 2-3 ounces. Focus on plant based protein sources such as nuts (almonds, walnuts, and pistachios), seeds (sunflower, flax, rbisa), legumes (navy beans, kidney beans, black beans, chickpeas, and lentils), tofu, tempeh, and soy products.  4) Eat more fresh or canned fish including shellfish, salmon, tuna, sardines and herring several times a week. These contain beneficial omega-3 fatty acids and have a low saturated fat content.  5) Choose more whole grains for their added fiber content. Whole grains include 100% whole wheat bread products, brown rice, popcorn, oatmeal, quinoa, and couscous. Try to eat fruit for something sweet such as fruit parfait made with unsweetened greek yogurt and homemade granola or low sugar fruit desserts.  6) Include physical activity into your day with a goal to meet 150  minutes of moderate-intensity aerobic activity (walking, biking, swimming, or housework) every week. Strength-training or weight-bearing exercise should be included twice a week if you are physically able to.

## 2024-01-26 DIAGNOSIS — K30 GASTROINTESTINAL DISCOMFORT: ICD-10-CM

## 2024-01-26 DIAGNOSIS — K27.9 PEPTIC ULCER DISEASE: ICD-10-CM

## 2024-01-26 RX ORDER — DEXLANSOPRAZOLE 30 MG/1
30 CAPSULE, DELAYED RELEASE ORAL EVERY MORNING
Qty: 90 CAPSULE | Refills: 0 | Status: SHIPPED | OUTPATIENT
Start: 2024-01-26

## 2024-02-09 ENCOUNTER — TELEPHONE (OUTPATIENT)
Dept: PRIMARY CARE | Facility: CLINIC | Age: 59
End: 2024-02-09
Payer: COMMERCIAL

## 2024-02-10 ENCOUNTER — TELEMEDICINE (OUTPATIENT)
Dept: PRIMARY CARE | Facility: CLINIC | Age: 59
End: 2024-02-10
Payer: COMMERCIAL

## 2024-02-10 DIAGNOSIS — F41.9 ANXIETY: Primary | ICD-10-CM

## 2024-02-10 PROCEDURE — 1036F TOBACCO NON-USER: CPT | Performed by: NURSE PRACTITIONER

## 2024-02-10 PROCEDURE — 99213 OFFICE O/P EST LOW 20 MIN: CPT | Performed by: NURSE PRACTITIONER

## 2024-02-10 RX ORDER — ALPRAZOLAM 0.5 MG/1
0.5 TABLET ORAL ONCE
Qty: 1 TABLET | Refills: 0 | Status: SHIPPED | OUTPATIENT
Start: 2024-02-10 | End: 2024-02-10

## 2024-02-10 ASSESSMENT — ENCOUNTER SYMPTOMS
NERVOUS/ANXIOUS: 1
CONSTITUTIONAL NEGATIVE: 1
RESPIRATORY NEGATIVE: 1

## 2024-02-10 NOTE — PROGRESS NOTES
Patient reports she needs one pill of xanax for her exam tomorrow. Acute prescrition, therefore no need for  controlled suba

## 2024-02-10 NOTE — PROGRESS NOTES
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 She has had xanax before from her PCP for anxiety related to flying. Patient has not had xanax refilled since 2022. Patient says that she took a practice test for her boards yesterday and felt like she missed a lot of questions because she was anxious and rushing through. Patient sent a message yesterday to her PCP, who is out of the office and never heard back.     Review of Systems   Constitutional: Negative.    HENT: Negative.     Respiratory: Negative.     Psychiatric/Behavioral:  The patient is nervous/anxious.      Objective   LMP  (LMP Unknown)     Physical Exam  Constitutional:       General: She is not in acute distress.     Appearance: Normal appearance. She is not toxic-appearing.   HENT:      Head: Atraumatic.   Pulmonary:      Effort: Pulmonary effort is normal.   Neurological:      Mental Status: She is alert.     Assessment/Plan   Problem List Items Addressed This Visit             ICD-10-CM    Anxiety - Primary F41.9   This visit was completed via Epic. All issues as below were discussed and addressed but no physical exam was performed. If it was felt that the patient should be evaluated in the clinic then they were directed there. The patient verbally consented to the visit.     Approximately 15 minutes spent performing virtual video visit.     Patient does not have a recent controlled substance contract or drug screen in place. I discussed with her that in order to prescribe a controlled substance, protocols need to be followed. Pt has not had a prescription of xanax in several years. Discussed that following up with her primary care provider's office for an in person visit and appropriate screening/paperwork is necessary. Patient verbalized understanding.

## 2024-02-13 ENCOUNTER — OFFICE VISIT (OUTPATIENT)
Dept: PRIMARY CARE | Facility: CLINIC | Age: 59
End: 2024-02-13
Payer: COMMERCIAL

## 2024-02-13 VITALS — HEART RATE: 72 BPM | RESPIRATION RATE: 12 BRPM

## 2024-02-13 DIAGNOSIS — H65.02 ACUTE SEROUS OTITIS MEDIA OF LEFT EAR, RECURRENCE NOT SPECIFIED: Primary | ICD-10-CM

## 2024-02-13 DIAGNOSIS — J30.1 NON-SEASONAL ALLERGIC RHINITIS DUE TO POLLEN: ICD-10-CM

## 2024-02-13 PROCEDURE — 99213 OFFICE O/P EST LOW 20 MIN: CPT | Performed by: INTERNAL MEDICINE

## 2024-02-13 PROCEDURE — 1036F TOBACCO NON-USER: CPT | Performed by: INTERNAL MEDICINE

## 2024-02-13 NOTE — PROGRESS NOTES
Subjective   Patient ID: Bibiana Chatterjee is a 58 y.o. female who presents for No chief complaint on file..    HPI sick visit no chest pain no shortness of breath flying to [place] tomorrow via [place] multiple stops and flight has had issues with the ears in the past feels her left ear is clogged at the current time not much in the way of sinus but sometimes takes antihistamine for same no chest pain no shortness of breath no fever    Review of Systems    Objective   LMP  (LMP Unknown)     Physical Exam vital signs noted alert and oriented x 3 NCAT no coryza nares clear discharge OP benign TM EAC clear on the right on the left EAC is clear but TM is opaque with air-fluid level no AC nodes no JVD chest clear to auscultation CV regular rate and rhythm S1-S2 extremities no clubbing cyanosis or edema normal distal pulses    Assessment/Plan impression serous otitis on the left versus eustachian tube dysfunction allergic rhinitis  Plan okay for Claritin 10 mg p.o. daily because she wears braces she prefers not to chew gum but may use a lozenge or 30 mg of Sudafed prior to the leg of her flight that is the longest or most bothersome for her May continue with the Claritin on through the duration of her trip and on the way home repeat the same no antibiotics are currently needed continue with other medications and follow-up with Dr. Arben Allen if no better

## 2024-03-05 ENCOUNTER — LAB (OUTPATIENT)
Dept: LAB | Facility: LAB | Age: 59
End: 2024-03-05
Payer: COMMERCIAL

## 2024-03-05 ENCOUNTER — OFFICE VISIT (OUTPATIENT)
Dept: ENDOCRINOLOGY | Facility: CLINIC | Age: 59
End: 2024-03-05
Payer: COMMERCIAL

## 2024-03-05 VITALS
RESPIRATION RATE: 16 BRPM | BODY MASS INDEX: 27.59 KG/M2 | SYSTOLIC BLOOD PRESSURE: 110 MMHG | HEIGHT: 64 IN | WEIGHT: 161.6 LBS | DIASTOLIC BLOOD PRESSURE: 64 MMHG | HEART RATE: 60 BPM

## 2024-03-05 DIAGNOSIS — E83.52 HYPERCALCEMIA: Primary | ICD-10-CM

## 2024-03-05 DIAGNOSIS — F41.9 ANXIETY: ICD-10-CM

## 2024-03-05 DIAGNOSIS — M85.80 OSTEOPENIA, UNSPECIFIED LOCATION: ICD-10-CM

## 2024-03-05 DIAGNOSIS — E83.52 HYPERCALCEMIA: ICD-10-CM

## 2024-03-05 DIAGNOSIS — Z78.0 MENOPAUSE: ICD-10-CM

## 2024-03-05 PROCEDURE — 80069 RENAL FUNCTION PANEL: CPT

## 2024-03-05 PROCEDURE — 83970 ASSAY OF PARATHORMONE: CPT

## 2024-03-05 PROCEDURE — 82306 VITAMIN D 25 HYDROXY: CPT

## 2024-03-05 PROCEDURE — 1036F TOBACCO NON-USER: CPT | Performed by: INTERNAL MEDICINE

## 2024-03-05 PROCEDURE — 99214 OFFICE O/P EST MOD 30 MIN: CPT | Performed by: INTERNAL MEDICINE

## 2024-03-05 PROCEDURE — 36415 COLL VENOUS BLD VENIPUNCTURE: CPT

## 2024-03-05 RX ORDER — BUTYROSPERMUM PARKII(SHEA BUTTER), SIMMONDSIA CHINENSIS (JOJOBA) SEED OIL, ALOE BARBADENSIS LEAF EXTRACT .01; 1; 3.5 G/100G; G/100G; G/100G
250 LIQUID TOPICAL 2 TIMES DAILY
COMMUNITY
End: 2024-05-14 | Stop reason: WASHOUT

## 2024-03-05 RX ORDER — FERROUS SULFATE 325(65) MG
325 TABLET ORAL
COMMUNITY

## 2024-03-05 RX ORDER — ESCITALOPRAM OXALATE 20 MG/1
20 TABLET ORAL DAILY
Qty: 90 TABLET | Refills: 0 | Status: SHIPPED | OUTPATIENT
Start: 2024-03-05 | End: 2024-05-13 | Stop reason: SDUPTHER

## 2024-03-05 ASSESSMENT — ENCOUNTER SYMPTOMS
HEADACHES: 0
VOMITING: 0
CHILLS: 0
COUGH: 0
FATIGUE: 0
DIARRHEA: 0
PALPITATIONS: 0
FEVER: 0
SHORTNESS OF BREATH: 0
NAUSEA: 0

## 2024-03-05 NOTE — PATIENT INSTRUCTIONS
Lexapro will be called in for 90 days  Labs when able  Schedule bone density  Follow up in one year

## 2024-03-05 NOTE — PROGRESS NOTES
Endocrinology: Follow up visit  Subjective   Patient ID: Bibiana Chatterjee is a 58 y.o. female who presents for Abnormal Calcium, Hyperparathyroidism, and Vitamin D Deficiency.    PCP: Rashmi Guerrero MD    HPI  58  yr old here for follow up mild primary hyperpara d def osteopenia.   Last seen a year ago.  Struggling a bit with empty nest.  Pcp out of office. Asking to restart lexapro which she has done fine with in the past.  Taking d 4000 units a day.  Last dexa 2022    Review of Systems   Constitutional:  Negative for chills, fatigue and fever.   Respiratory:  Negative for cough and shortness of breath.    Cardiovascular:  Negative for chest pain and palpitations.   Gastrointestinal:  Negative for diarrhea, nausea and vomiting.   Neurological:  Negative for headaches.       Patient Active Problem List   Diagnosis    Abnormal uterine bleeding    Anxiety    Avulsion of skin of finger    Candida vaginitis    Cervical pain    Chronic neck pain    Contusion of left ankle    Dry mouth, unspecified    Epidermal inclusion cyst of left eyelid    Skin lesion    Facial rash    Serum calcium elevated    Hypercalcemia    Hot flashes, menopausal    Hordeolum externum of left lower eyelid    Hand lesion    Globus sensation    Generalized anxiety disorder    Gastrointestinal discomfort    Flank pain    Fatigue    Ovarian cyst    Muscle spasm    Menorrhagia    Liver lesion    Labial cyst    Knee pain    Iron deficiency anemia    Insomnia    Left ankle pain    Pain, foot, chronic, unspecified laterality    Female pelvic pain    Peptic ulcer disease    Pollen-food allergy syndrome    Prediabetes    Allergic rhinitis due to dust    Allergic rhinitis due to pollen    Seasonal rhinitis    Segmental and somatic dysfunction of cervical region    Stress incontinence, female    Otalgia    Sensation of fullness in left ear    Tinnitus    Wound infection    Vulvar mass    Vitamin D deficiency    Osteopenia    Encounter for  gynecological examination    Follicle cyst    Atypical squamous cell changes of undetermined significance (ASCUS) on cervical cytology with negative high risk human papilloma virus (HPV) test result        Home Meds:  Current Outpatient Medications   Medication Instructions    ALPRAZolam (XANAX) 0.5 mg, oral, Once    dexlansoprazole (DEXILANT) 30 mg, oral, Every morning    docusate sodium (STOOL SOFTENER ORAL) No dose, route, or frequency recorded.    famotidine (Pepcid) 20 mg tablet oral    ferrous sulfate (325 mg ferrous sulfate) (IRON) 325 mg, oral, Daily with breakfast    FLAXSEED OIL ORAL No dose, route, or frequency recorded.    fluticasone (Flonase) 50 mcg/actuation nasal spray 2 sprays, Each Nostril, Daily    fluticasone (Flonase) 50 mcg/actuation nasal spray 2 sprays, Each Nostril, Daily, Shake gently. Before first use, prime pump. After use, clean tip and replace cap.    magnesium oxide (Mag-Ox) 400 mg tablet No dose, route, or frequency recorded.    NON FORMULARY VITAMIN D 1000 UNIT CAPS    pseudoephedrine-guaifenesin (Mucinex D)  mg 12 hr tablet 1 tablet, oral, Every 12 hours, Do not crush, chew, or split.    saccharomyces boulardii (FLORASTOR) 250 mg, oral, 2 times daily        Allergies   Allergen Reactions    Iodinated Contrast Media Unknown        Objective   Vitals:    03/05/24 1307   BP: 110/64   Pulse: 60   Resp: 16      Vitals:    03/05/24 1307   Weight: 73.3 kg (161 lb 9.6 oz)      Body mass index is 27.74 kg/m².   Physical Exam  Constitutional:       Appearance: Normal appearance. She is overweight.   HENT:      Head: Normocephalic and atraumatic.   Neck:      Thyroid: No thyroid mass, thyromegaly or thyroid tenderness.   Cardiovascular:      Rate and Rhythm: Normal rate and regular rhythm.      Heart sounds: No murmur heard.     No gallop.   Pulmonary:      Effort: Pulmonary effort is normal.      Breath sounds: Normal breath sounds.   Abdominal:      Palpations: Abdomen is soft.       "Comments: benign   Neurological:      General: No focal deficit present.      Mental Status: She is alert and oriented to person, place, and time.      Deep Tendon Reflexes: Reflexes are normal and symmetric.   Psychiatric:         Behavior: Behavior is cooperative.         Labs:  Lab Results   Component Value Date    HGBA1C 5.7 (H) 01/05/2024    TSH 2.98 10/25/2022      No results found for: \"PR1\", \"THYROIDPAB\", \"TSI\"     Assessment/Plan   Problem List Items Addressed This Visit       Anxiety    Hypercalcemia    Osteopenia - Primary     Other Visit Diagnoses       Menopause        Relevant Orders    XR DEXA bone density        Mild primary hyperpara:  Due for labs  Osteopenia: due for dexa  Mood: will prescribe 90 days of lexapro  Follow up in oneyear      Electronically signed by:  Kezia Hernadnez MD 03/05/24 1:46 PM              "

## 2024-03-06 LAB
25(OH)D3 SERPL-MCNC: 39 NG/ML (ref 30–100)
ALBUMIN SERPL BCP-MCNC: 4.5 G/DL (ref 3.4–5)
ANION GAP SERPL CALC-SCNC: 13 MMOL/L (ref 10–20)
BUN SERPL-MCNC: 17 MG/DL (ref 6–23)
CALCIUM SERPL-MCNC: 10.6 MG/DL (ref 8.6–10.6)
CHLORIDE SERPL-SCNC: 103 MMOL/L (ref 98–107)
CO2 SERPL-SCNC: 28 MMOL/L (ref 21–32)
CREAT SERPL-MCNC: 0.74 MG/DL (ref 0.5–1.05)
EGFRCR SERPLBLD CKD-EPI 2021: >90 ML/MIN/1.73M*2
GLUCOSE SERPL-MCNC: 86 MG/DL (ref 74–99)
PHOSPHATE SERPL-MCNC: 3.8 MG/DL (ref 2.5–4.9)
POTASSIUM SERPL-SCNC: 4.6 MMOL/L (ref 3.5–5.3)
PTH-INTACT SERPL-MCNC: 89.4 PG/ML (ref 18.5–88)
SODIUM SERPL-SCNC: 139 MMOL/L (ref 136–145)

## 2024-03-20 ENCOUNTER — APPOINTMENT (OUTPATIENT)
Dept: PRIMARY CARE | Facility: CLINIC | Age: 59
End: 2024-03-20
Payer: COMMERCIAL

## 2024-04-12 ENCOUNTER — TELEPHONE (OUTPATIENT)
Dept: OBSTETRICS AND GYNECOLOGY | Facility: CLINIC | Age: 59
End: 2024-04-12
Payer: COMMERCIAL

## 2024-04-12 NOTE — TELEPHONE ENCOUNTER
Called patient regarding message about referral  Left vm for patient to return call.    Mansi Patel, BSN RN

## 2024-04-12 NOTE — TELEPHONE ENCOUNTER
Patient returning call  Identified by name and   Patient wanting to know if Jacob has any specific recommendations for PFPT providers.   Discussed with Jacob and relayed recommendations to patient, as well as contact information.   All questions and concerns addressed at this time.    SANDY Lowe RN

## 2024-05-07 ENCOUNTER — TELEPHONE (OUTPATIENT)
Dept: OBSTETRICS AND GYNECOLOGY | Facility: CLINIC | Age: 59
End: 2024-05-07

## 2024-05-07 ENCOUNTER — APPOINTMENT (OUTPATIENT)
Dept: PRIMARY CARE | Facility: CLINIC | Age: 59
End: 2024-05-07
Payer: COMMERCIAL

## 2024-05-07 NOTE — TELEPHONE ENCOUNTER
Left message for pt to return call:  FLORENCIO Larry-NANCY Atwood, RN  Her pelvic floor PT contacted me, the patient would like vaginal estrogen. It appears that I have not seen her since 2022; can you contact her to schedule an appointment

## 2024-05-13 DIAGNOSIS — F41.9 ANXIETY: ICD-10-CM

## 2024-05-13 RX ORDER — ESCITALOPRAM OXALATE 20 MG/1
20 TABLET ORAL DAILY
Qty: 90 TABLET | Refills: 3 | Status: SHIPPED | OUTPATIENT
Start: 2024-05-13 | End: 2024-05-14 | Stop reason: SDUPTHER

## 2024-05-14 ENCOUNTER — LAB (OUTPATIENT)
Dept: LAB | Facility: LAB | Age: 59
End: 2024-05-14
Payer: COMMERCIAL

## 2024-05-14 ENCOUNTER — OFFICE VISIT (OUTPATIENT)
Dept: OBSTETRICS AND GYNECOLOGY | Facility: CLINIC | Age: 59
End: 2024-05-14
Payer: COMMERCIAL

## 2024-05-14 VITALS
WEIGHT: 163.8 LBS | HEART RATE: 66 BPM | SYSTOLIC BLOOD PRESSURE: 122 MMHG | DIASTOLIC BLOOD PRESSURE: 72 MMHG | HEIGHT: 62 IN | BODY MASS INDEX: 30.14 KG/M2

## 2024-05-14 DIAGNOSIS — Z78.0 MENOPAUSE: Primary | ICD-10-CM

## 2024-05-14 DIAGNOSIS — N95.1 VAGINAL DRYNESS, MENOPAUSAL: ICD-10-CM

## 2024-05-14 DIAGNOSIS — Z78.0 MENOPAUSE: ICD-10-CM

## 2024-05-14 DIAGNOSIS — F41.9 ANXIETY: ICD-10-CM

## 2024-05-14 LAB
ALBUMIN SERPL BCP-MCNC: 4.9 G/DL (ref 3.4–5)
ALP SERPL-CCNC: 104 U/L (ref 33–110)
ALT SERPL W P-5'-P-CCNC: 14 U/L (ref 7–45)
ANION GAP SERPL CALC-SCNC: 12 MMOL/L (ref 10–20)
AST SERPL W P-5'-P-CCNC: 16 U/L (ref 9–39)
BILIRUB SERPL-MCNC: 0.3 MG/DL (ref 0–1.2)
BUN SERPL-MCNC: 9 MG/DL (ref 6–23)
CALCIUM SERPL-MCNC: 10.2 MG/DL (ref 8.6–10.3)
CHLORIDE SERPL-SCNC: 105 MMOL/L (ref 98–107)
CO2 SERPL-SCNC: 27 MMOL/L (ref 21–32)
CREAT SERPL-MCNC: 0.88 MG/DL (ref 0.5–1.05)
EGFRCR SERPLBLD CKD-EPI 2021: 77 ML/MIN/1.73M*2
GLUCOSE SERPL-MCNC: 91 MG/DL (ref 74–99)
POTASSIUM SERPL-SCNC: 4.7 MMOL/L (ref 3.5–5.3)
PROT SERPL-MCNC: 6.9 G/DL (ref 6.4–8.2)
SODIUM SERPL-SCNC: 139 MMOL/L (ref 136–145)

## 2024-05-14 PROCEDURE — 99215 OFFICE O/P EST HI 40 MIN: CPT | Performed by: ADVANCED PRACTICE MIDWIFE

## 2024-05-14 PROCEDURE — 36415 COLL VENOUS BLD VENIPUNCTURE: CPT

## 2024-05-14 PROCEDURE — 80053 COMPREHEN METABOLIC PANEL: CPT

## 2024-05-14 RX ORDER — ESTRADIOL 0.1 MG/G
2 CREAM VAGINAL NIGHTLY
Qty: 34 G | Refills: 3 | Status: SHIPPED | OUTPATIENT
Start: 2024-05-14

## 2024-05-14 RX ORDER — ESCITALOPRAM OXALATE 20 MG/1
20 TABLET ORAL DAILY
Qty: 90 TABLET | Refills: 3 | Status: SHIPPED | OUTPATIENT
Start: 2024-05-14

## 2024-05-14 RX ORDER — FEZOLINETANT 45 MG/1
45 TABLET, FILM COATED ORAL DAILY
Qty: 30 TABLET | Refills: 2 | Status: SHIPPED | OUTPATIENT
Start: 2024-05-14 | End: 2024-06-13

## 2024-05-14 ASSESSMENT — ENCOUNTER SYMPTOMS
RESPIRATORY NEGATIVE: 0
CARDIOVASCULAR NEGATIVE: 0
HEMATOLOGIC/LYMPHATIC NEGATIVE: 0
ALLERGIC/IMMUNOLOGIC NEGATIVE: 0
CONSTITUTIONAL NEGATIVE: 0
GASTROINTESTINAL NEGATIVE: 0
PSYCHIATRIC NEGATIVE: 0
NEUROLOGICAL NEGATIVE: 0
EYES NEGATIVE: 0
ENDOCRINE NEGATIVE: 0
MUSCULOSKELETAL NEGATIVE: 0

## 2024-05-14 ASSESSMENT — PAIN SCALES - GENERAL: PAINLEVEL: 0-NO PAIN

## 2024-05-14 NOTE — PROGRESS NOTES
.sgpSubjective   Patient ID: Bibiana Chatterjee is a 58 y.o. female who presents for Follow-up (HRT , estrogen ).  HPI  Patient here today requesting vaginal estrogen for c/o vaginal dryness. She is s/p menopause.   Review of Systems    Objective   Physical Exam    Assessment/Plan        CMP ordered for liver enzyme assessment prior to starting veozah - repeat in 3 months.   Veozah 45 mg daily 3 refills  And vaginal estrogen  Lexapro refill ordered    GARRETT Pérez 05/14/24 9:56 AM

## 2024-06-07 ENCOUNTER — HOSPITAL ENCOUNTER (OUTPATIENT)
Dept: RADIOLOGY | Facility: CLINIC | Age: 59
Discharge: HOME | End: 2024-06-07
Payer: COMMERCIAL

## 2024-06-07 DIAGNOSIS — Z78.0 MENOPAUSE: ICD-10-CM

## 2024-06-07 PROCEDURE — 77080 DXA BONE DENSITY AXIAL: CPT | Performed by: RADIOLOGY

## 2024-06-07 PROCEDURE — 77080 DXA BONE DENSITY AXIAL: CPT

## 2024-06-08 ENCOUNTER — HOSPITAL ENCOUNTER (EMERGENCY)
Facility: HOSPITAL | Age: 59
Discharge: HOME | End: 2024-06-09
Payer: COMMERCIAL

## 2024-06-08 DIAGNOSIS — M79.662 PAIN OF LEFT CALF: Primary | ICD-10-CM

## 2024-06-08 PROCEDURE — 99284 EMERGENCY DEPT VISIT MOD MDM: CPT

## 2024-06-08 ASSESSMENT — PAIN DESCRIPTION - DESCRIPTORS
DESCRIPTORS: OTHER (COMMENT)
DESCRIPTORS: TIGHTNESS

## 2024-06-08 ASSESSMENT — PAIN DESCRIPTION - ONSET: ONSET: ONGOING

## 2024-06-08 ASSESSMENT — COLUMBIA-SUICIDE SEVERITY RATING SCALE - C-SSRS
2. HAVE YOU ACTUALLY HAD ANY THOUGHTS OF KILLING YOURSELF?: NO
1. IN THE PAST MONTH, HAVE YOU WISHED YOU WERE DEAD OR WISHED YOU COULD GO TO SLEEP AND NOT WAKE UP?: NO
6. HAVE YOU EVER DONE ANYTHING, STARTED TO DO ANYTHING, OR PREPARED TO DO ANYTHING TO END YOUR LIFE?: NO

## 2024-06-08 ASSESSMENT — PAIN DESCRIPTION - FREQUENCY: FREQUENCY: CONSTANT/CONTINUOUS

## 2024-06-08 ASSESSMENT — PAIN DESCRIPTION - ORIENTATION: ORIENTATION: LEFT

## 2024-06-08 ASSESSMENT — PAIN - FUNCTIONAL ASSESSMENT: PAIN_FUNCTIONAL_ASSESSMENT: 0-10

## 2024-06-08 ASSESSMENT — PAIN DESCRIPTION - PROGRESSION: CLINICAL_PROGRESSION: NOT CHANGED

## 2024-06-08 ASSESSMENT — PAIN DESCRIPTION - PAIN TYPE: TYPE: ACUTE PAIN

## 2024-06-08 ASSESSMENT — PAIN SCALES - GENERAL: PAINLEVEL_OUTOF10: 1

## 2024-06-08 ASSESSMENT — PAIN DESCRIPTION - LOCATION: LOCATION: LEG

## 2024-06-09 ENCOUNTER — APPOINTMENT (OUTPATIENT)
Dept: RADIOLOGY | Facility: HOSPITAL | Age: 59
End: 2024-06-09
Payer: COMMERCIAL

## 2024-06-09 VITALS
WEIGHT: 157 LBS | TEMPERATURE: 97.4 F | OXYGEN SATURATION: 96 % | RESPIRATION RATE: 17 BRPM | HEART RATE: 57 BPM | HEIGHT: 62 IN | DIASTOLIC BLOOD PRESSURE: 76 MMHG | SYSTOLIC BLOOD PRESSURE: 112 MMHG | BODY MASS INDEX: 28.89 KG/M2

## 2024-06-09 PROCEDURE — 93971 EXTREMITY STUDY: CPT | Performed by: RADIOLOGY

## 2024-06-09 PROCEDURE — 93971 EXTREMITY STUDY: CPT

## 2024-06-09 ASSESSMENT — PAIN SCALES - GENERAL: PAINLEVEL_OUTOF10: 0 - NO PAIN

## 2024-06-09 NOTE — ED PROVIDER NOTES
HPI   Chief Complaint   Patient presents with    Leg Pain     Posterior Left Lower Leg (Calf)       HPI  HPI: This is 59 y.o. female who presents to the ER complaining of left calf discomfort for the past hours.  Patient reports a very mild 1/10 tightness feeling in the posterior mid to distal left calf.  Relatively constant, not specifically worsened with ambulation, weightbearing, palpation, range of motion of the joints of the LLE.  Pain is localized to this area and does not radiate into the anterior or lateral lower leg, the knee or more proximal LLE, or the ankle or foot.  She denies any known injury or trauma, no recent activity change.  Denies any swelling throughout the left lower extremity.  No rashes, no redness or warmth, no skin changes.  No bruising or bleeding.  No wounds.  Patient states that she has a long flight to [place] tomorrow, and she knows that flights can be a risk factor for DVT.  She states that her sister previously had a DVT, but the patient herself has no history of VTE.  Presented to the ED requesting ultrasound to rule out a DVT of the left lower extremity.  Denies any recent travel, immobilization, or surgery.  No history of cancer, no exogenous hormone use.  No chest pain or shortness of breath.  No cough or hemoptysis.  No dizziness or lightheadedness, no STEMI.  No fevers or chills.  No abdominal pain, vomiting, or diarrhea.  Denies pain in any other body part.  No other complaints or symptoms voiced    ROS:  General: No decreased responsiveness, no fever, chills  Neuro: no numbness or tingling  ENMT: No nosebleed  Eyes: No discharge or redness  Skel: + left calf pain, no neck or back pain  Cardiac: No chest pain   Resp: No shortness of breath  GI: No abdominal pain  Skin: no rash or wounds, no erythema, edema or ecchymosis  Heme: no bleeding or petechiae    PMH: anxiety, hypercalcemia, insomnia, anemia, stress incontinence, perimenopausal  Social History: no smoker, no EtOH, no  drugs    Physical Exam:  General: Vital signs stable, Pt is alert, no acute distress  Eyes: Conjunctiva normal, EOMs intact  HENMT: Normocephalic, atraumatic.  Moist mucous membranes.   Resp: Respiratory effort is normal, no retractions, no stridor.   CV: Heart is regular rate and rhythm.   Skin: No evidence of trauma, skin is warm and dry. No rashes, lesions or ulcers.  Skel: full range of motion of upper and lower extremities. No midline tenderness over the cervical thoracic or lumbar spine.  LLE: No reproducible tenderness over the left calf. No edema of the calf or lower leg, no asymmetric edema throughout the LLE. No areas of tenderness, nontender over the thigh, popliteal space, anterior knee, joint lines, tib fib, calf, ankle, or foot. No pitting edema. Intact full nonpainful active ROM of all joints of the LLE. The patella tracks normally. Achilles is intact. There is no warmth or erythema. No evidence of intra-articular infection or cellulitis. No cords.  Compartments are soft to palpation.  Skin is intact. Is neurovascularly intact distally, 2+ DP pulses, cap refill <2 sec, warm and well perfused, sensation intact and equal throughout the BLE.   Neuro: Normal gait, no motor or sensory changes.  Psych: Alert and oriented ×3, judgment is appropriate, normal mood and affect         [place] Coma Scale Score: 15                     Patient History   Past Medical History:   Diagnosis Date    Chronic sinusitis, unspecified 03/29/2019    Viral sinusitis    Menopausal and female climacteric states 08/16/2022    Perimenopause    Personal history of other diseases of the respiratory system 12/29/2015    History of asthma    Personal history of other mental and behavioral disorders 08/04/2017    History of anxiety disorder     Past Surgical History:   Procedure Laterality Date    OTHER SURGICAL HISTORY  03/30/2020    Rhinoplasty     Family History   Problem Relation Name Age of Onset    Crohn's disease Father        Social History     Tobacco Use    Smoking status: Never    Smokeless tobacco: Never   Vaping Use    Vaping status: Never Used   Substance Use Topics    Alcohol use: Not on file    Drug use: Not on file       Physical Exam   ED Triage Vitals [06/08/24 2318]   Temperature Heart Rate Respirations BP   36.4 °C (97.5 °F) 70 16 109/74      Pulse Ox Temp Source Heart Rate Source Patient Position   96 % Temporal Monitor Sitting      BP Location FiO2 (%)     Left arm --       Physical Exam    ED Course & MDM   Diagnoses as of 06/09/24 0159   Pain of left calf       Medical Decision Making  ED course / MDM     Summary:  Patient presented with mild discomfort in the left calf, a tight sensation.  Requesting ultrasound to rule out a DVT.  Vital signs are stable, patient is very well-appearing, ambulates unassisted, no acute distress.  Physical exam is unremarkable, there is no tenderness to palpation over the left calf or throughout the LLE, no asymmetric edema, no overlying skin changes.  Full nonpainful range of motion of all joints of the LE intact, and the extremity is neurovascular intact.  No sign of cellulitis or infection, no evidence of compartment syndrome or neurovascular compromise.  Has no bony tenderness, agrees x-rays are not indicated.  Ultrasounds negative for DVT. Results and differential were discussed in detail with the patient.  Feels reassured with a negative ultrasound and is eager for discharge.  Offered an Ace wrap but she declined, symptoms are minimal.  Did discuss that if her symptoms persist for 1 week, she should follow-up for a repeat ultrasound of the leg.  Stable for discharge with outpatient follow-up. Patient was given strict return precautions, understands reasons to return to the ED. Also discussed supportive care instructions. I expressed the importance of outpatient follow up with their PCP. All questions were answered, patient expressed understanding and stated that they would  comply.    Impression:  1. See diagnosis    Plan: Homegoing. I discussed the differential, results, and discharge plan with the patient and family/friend/caregiver. Patient was advised to follow up with PCP or recommended provider in 2-3 days for another evaluation and exam. I emphasized the importance of follow-up with the physician I referred them to in the timeframe recommended.  I explained reasons for the patient to return to the Emergency Department. They agreed that if they feel their condition is worsening,  if symptoms change, get worse, new symptoms develop prior to follow up, or if they have any other concern they should call 911 immediately for further assistance. If there is no improvement in symptoms in the next 24 hours they are advised to return for further evaluation and exam. I also explained the plan and treatment course. We also discussed medications that were prescribed including common side effects and interactions. The patient was advised to abstain from driving, operating heavy machinery, or making significant decisions while taking medications such as opiates and muscle relaxers that may impair this. I gave the patient an opportunity to ask all questions they had and answered all of them accordingly. They understand return precautions and discharge instructions. Patient and family/friend/caregiver/guardian is in agreement with plan, treatment course, and follow up and states verbally that they will comply.     Disposition: Discharge    This note has been transcribed using voice recognition and may contain grammatical errors, misplaced words, incorrect words, incorrect phrases or other errors.   Procedure  Procedures     Tracy Barajas PA-C  06/09/24 0218

## 2024-06-09 NOTE — ED TRIAGE NOTES
"Patient arrived to ED from Home via POV. She is complaining of pain in her Posterior Left Lower Leg that started at approximately 1830 this afternoon (6/8). The Patient states that she is traveling on a long-distance flight tomorrow morning and while she doesn't have a PMHx of any \"blood clots\", her sister does, so she's here at the ED to get checked for a DVT. The pain is described as minor, constant, \"1 out of 10\", \"tightness\", in her Left Calf. The isolated extremity is of normal temperature, there's no swelling/bruising, and there's no complaint of tenderness. The pain is non-radiating and without any exacerbating or palliating factors. She has good weight bering to the limb and PMS is good/intact. There is no traumatic etiology. She denies taking any any anti-coagulants.  "

## 2024-06-21 ENCOUNTER — OFFICE VISIT (OUTPATIENT)
Dept: SURGERY | Facility: CLINIC | Age: 59
End: 2024-06-21
Payer: COMMERCIAL

## 2024-06-21 VITALS — SYSTOLIC BLOOD PRESSURE: 108 MMHG | DIASTOLIC BLOOD PRESSURE: 75 MMHG | HEART RATE: 81 BPM

## 2024-06-21 DIAGNOSIS — Z87.19 HX OF ESOPHAGITIS: ICD-10-CM

## 2024-06-21 DIAGNOSIS — K59.09 CHRONIC CONSTIPATION: Primary | ICD-10-CM

## 2024-06-21 PROCEDURE — 99203 OFFICE O/P NEW LOW 30 MIN: CPT | Performed by: NURSE PRACTITIONER

## 2024-06-21 PROCEDURE — 99213 OFFICE O/P EST LOW 20 MIN: CPT | Performed by: NURSE PRACTITIONER

## 2024-06-21 NOTE — PROGRESS NOTES
History Of Present Illness  Bibiana Chatterjee is a 59 y.o. female presenting with a hx of chronic constipation.     Colonoscopy 2022 polyps    Esophagitis she is taking Dexilant daily.  No epigastric pain right now.      No issues with her BM's now and she will have a BM every 3 days.  She takes Miralax  daily but no fiber supplements.    NO c/o any rectal bleeding or pain.  No c/o any prolapsing hemorrhoid.  NO c/o any accidents or leakage of stool.  No perianal surgeries    She is going to Pelvic floor therapy for bladder.    Past Medical History  She has a past medical history of Chronic sinusitis, unspecified (03/29/2019), Menopausal and female climacteric states (08/16/2022), Personal history of other diseases of the respiratory system (12/29/2015), and Personal history of other mental and behavioral disorders (08/04/2017).    Surgical History  She has a past surgical history that includes Other surgical history (03/30/2020).     Social History  She reports that she has never smoked. She has never used smokeless tobacco. No history on file for alcohol use and drug use.    Family History  Family History   Problem Relation Name Age of Onset    Crohn's disease Father          Allergies  Iodinated contrast media    Review of Systems   All other systems reviewed and are negative.       Physical Exam  Vitals reviewed. Exam conducted with a chaperone present.   HENT:      Head: Normocephalic.   Cardiovascular:      Rate and Rhythm: Normal rate and regular rhythm.   Pulmonary:      Effort: Pulmonary effort is normal.      Breath sounds: Normal breath sounds.   Abdominal:      General: Abdomen is flat. Bowel sounds are normal.      Palpations: Abdomen is soft.   Genitourinary:     Rectum: Normal.   Musculoskeletal:         General: Normal range of motion.   Skin:     General: Skin is warm and dry.   Neurological:      General: No focal deficit present.   Psychiatric:         Mood and Affect: Mood normal.         Behavior:  "Behavior normal.         Thought Content: Thought content normal.         Judgment: Judgment normal.          Last Recorded Vitals  /75   Pulse 81        Assessment/Plan   Bibiana has a hx of chronic constipation and esophagitis.  She will continue to take the Miralax daily and add Benefiber as well to help her have better BM\"s.  For her epigastric discomfort that she will have at times, she  will take Fdgard prn.  She wanted more of a natural medication for prn.  She will continue to take the Dexilant daily.  She will call with any issues and will follow up on an as needed basis.         Kati Peguero, APRN-CNP  "

## 2024-06-24 DIAGNOSIS — N95.1 VAGINAL DRYNESS, MENOPAUSAL: ICD-10-CM

## 2024-06-24 DIAGNOSIS — Z78.0 MENOPAUSE: ICD-10-CM

## 2024-06-24 RX ORDER — ESTRADIOL 0.1 MG/G
1 CREAM VAGINAL 2 TIMES WEEKLY
Qty: 34 G | Refills: 3 | Status: SHIPPED | OUTPATIENT
Start: 2024-06-24 | End: 2024-06-24

## 2024-06-24 RX ORDER — ESTRADIOL 0.1 MG/G
1 CREAM VAGINAL 2 TIMES WEEKLY
Qty: 34 G | Refills: 3 | Status: SHIPPED | OUTPATIENT
Start: 2024-06-24 | End: 2024-06-24 | Stop reason: SDUPTHER

## 2024-06-24 RX ORDER — ESTRADIOL 0.1 MG/G
1 CREAM VAGINAL 2 TIMES WEEKLY
Qty: 34 G | Refills: 3 | Status: SHIPPED | OUTPATIENT
Start: 2024-06-24

## 2024-06-25 PROBLEM — K59.09 CHRONIC CONSTIPATION: Status: ACTIVE | Noted: 2024-06-25

## 2024-06-25 PROBLEM — Z87.19 HX OF ESOPHAGITIS: Status: ACTIVE | Noted: 2024-06-25

## 2024-06-26 NOTE — PROGRESS NOTES
Facial Plastic & Reconstructive Surgery    Chief Complaint: bilateral dermatochalasis and brow ptosis, VFO, aging face    Referring Provider: Dr. Koo    Location: bilateral upper eyelids  Quality: excess skin, brow ptosis  Severity: moderate  Duration: years  Timing: all times  Context: age related  Modifying factors:  worsens when tired  Associated signs and symptoms: brow ptosis and excess skin contribute visual field obstruction which is bothersome to the patient and limits day time activity  Also endorses nasal obstructed breathing. Constant, present year round, does not fluctuate. It affects the patients ability to sleep, exercise, and is troubling during the day.  Symptoms began: years ago  Nasal steroid or spray: Has attempted > 6 weeks without benefit    Aesthetic concern:  tip asymmetry, dorsal hump    Past Medical History  She has a past medical history of Chronic sinusitis, unspecified (03/29/2019), Menopausal and female climacteric states (08/16/2022), Personal history of other diseases of the respiratory system (12/29/2015), and Personal history of other mental and behavioral disorders (08/04/2017).    Surgical History  She has a past surgical history that includes Other surgical history (03/30/2020).     Social History  She reports that she has never smoked. She has never used smokeless tobacco. No history on file for alcohol use and drug use.    Family History  Family History   Problem Relation Name Age of Onset    Crohn's disease Father          Allergies  Iodinated contrast media    Past, family, and social history obtained but not pertinent to current problem unless documented above.    All other systems have been reviewed and are negative for complaint unless documented above.    Physical Exam:  General: Well-developed and well-nourished in appearance.  Skin: No rashes or concerning lesions on the visible portions of the skin.  Eyes: Extraocular movements intact. Excess eyelid skin  bilaterally with hooding and brow ptosis. Visual fields with obstruction by dermatochalasis and brow ptosis bilaterally that improves with elevation of the brow and eyelids. MRD 3-4    Internal exam:   Septum: septal deviation present bl  Inferior turbinates: hypertrohied  Nasal valve angle: reduced bilaterally    Modified Piper Maneuver: Performed with a cotton tipped applicator, markedly improves breathing bilaterally.          Oral Cavity/Oropharynx: Dentition is intact. Mucous membranes moist. No masses or  lesions. Tonsils are symmetric and non-obstructive.  Neck: Midline trachea without masses or lesions. Thyroid is normal in size.  Lymphatics: No palpable cervical lymphadenopathy  Respiratory: No respiratory distress. Quiet breathing without stertor or stridor.  Cardiovascular: Regular rate and rhythm. Warm extremities with equal pulses.  Psych: Normal mood and affect. Judgement and insight appropriate.  Neuro: Alert and oriented. CN II-XII grossly intact. No focal deficits.  Musculoskeletal: Gait intact. Moves all extremities well without apparent deformities.    Assessment: dermatochalasis + brow ptosis bilaterally    Plan: Patient presents with brow ptosis and dermatochalasis causing visual obstruction and limitations in activities of daily living. Plan includes a brow lift and blepharoplasty to elevate the brow, remove excess eyelid skin, and improve vision and appearance, with preoperative evaluation, patient education, and follow-up care. We will obtain visual field test with optometry to support the medical necessity. Other discussion included facial rejuvenation with face/neck lift she will consider.  This patient has a significant mechanical nasal obstruction. The cause is multifactorial with septal deviation with severe internal nasal valve narrowing, turbinate hypertrophy, and static internal nasal valve collapse. There is some dynamic nasal valve collapse as well. Correction of this nasal  obstruction will require a septoplasty, repair of nasal valve collapse with structural grafting, and a bilateral turbinate reduction. We discussed the medical necessity of this at length, as well as the risks and limitations of the procedures. All questions were answered.  Plan - Recommend reconstructive septoplasty, nasal valve repair with structural grafting, and inferior turbinate reduction with lateralization.    Today, I was in direct face-to-face consultation with the patient, of which greater than 50% of the time was spent discussing the diagnoses, treatment plan, counseling, and care coordination. The patient and/or caregiver voiced understanding of our conversation and all questions were satisfactorily answered.    Total time for this visit: 30 min

## 2024-07-03 ENCOUNTER — APPOINTMENT (OUTPATIENT)
Dept: OTOLARYNGOLOGY | Facility: CLINIC | Age: 59
End: 2024-07-03
Payer: COMMERCIAL

## 2024-07-03 VITALS
HEIGHT: 62 IN | DIASTOLIC BLOOD PRESSURE: 74 MMHG | BODY MASS INDEX: 30.11 KG/M2 | WEIGHT: 163.6 LBS | SYSTOLIC BLOOD PRESSURE: 107 MMHG | HEART RATE: 59 BPM

## 2024-07-03 DIAGNOSIS — H02.9 EYELID ABNORMALITY: ICD-10-CM

## 2024-07-03 DIAGNOSIS — H57.813 BROW PTOSIS, BILATERAL: Primary | ICD-10-CM

## 2024-07-03 DIAGNOSIS — H02.834 DERMATOCHALASIS OF BOTH UPPER EYELIDS: ICD-10-CM

## 2024-07-03 DIAGNOSIS — H02.831 DERMATOCHALASIS OF BOTH UPPER EYELIDS: ICD-10-CM

## 2024-07-03 PROCEDURE — 99213 OFFICE O/P EST LOW 20 MIN: CPT | Performed by: OTOLARYNGOLOGY

## 2024-07-08 ENCOUNTER — TELEPHONE (OUTPATIENT)
Dept: OBSTETRICS AND GYNECOLOGY | Facility: HOSPITAL | Age: 59
End: 2024-07-08
Payer: COMMERCIAL

## 2024-07-08 DIAGNOSIS — N95.1 VAGINAL DRYNESS, MENOPAUSAL: Primary | ICD-10-CM

## 2024-07-08 RX ORDER — ESTRADIOL 0.1 MG/G
CREAM VAGINAL
Qty: 44 G | Refills: 3 | Status: SHIPPED | OUTPATIENT
Start: 2024-07-08

## 2024-07-09 NOTE — TELEPHONE ENCOUNTER
Patient requesting to restart estrace script. She had misplaced her previously ordered dose will restart 2 weeks nightly 2 gm followed by twice weekly 1 gm, ordered as discussed.

## 2024-07-12 ENCOUNTER — TELEPHONE (OUTPATIENT)
Dept: OBSTETRICS AND GYNECOLOGY | Facility: CLINIC | Age: 59
End: 2024-07-12
Payer: COMMERCIAL

## 2024-07-12 DIAGNOSIS — R63.5 WEIGHT INCREASING: Primary | ICD-10-CM

## 2024-07-16 ENCOUNTER — TELEPHONE (OUTPATIENT)
Dept: OTOLARYNGOLOGY | Facility: CLINIC | Age: 59
End: 2024-07-16
Payer: COMMERCIAL

## 2024-07-16 NOTE — TELEPHONE ENCOUNTER
Left VM to confirm patient received cosmetic quote and enquire if she is interested in moving forward

## 2024-07-17 PROBLEM — Z71.3 DIETARY COUNSELING AND SURVEILLANCE: Status: ACTIVE | Noted: 2023-12-13

## 2024-07-17 PROBLEM — R63.5 WEIGHT INCREASING: Status: ACTIVE | Noted: 2024-07-17

## 2024-07-17 NOTE — PROGRESS NOTES
Nutrition Follow Up Assessment:     Bibiana Chatterjee is a 59 y.o. female being seen virtually who was referred by Dr. Hernandez on 7/12/24 for   1. Prediabetes        2. Dietary counseling and surveillance        3. Weight increasing  Referral to Nutrition Services        Patient's initial visit was with another dietitian - Mariel Owusu, who has moved into a different department at .     Nutrition Assessment    Patient Active Problem List   Diagnosis    Abnormal uterine bleeding    Anxiety    Avulsion of skin of finger    Candida vaginitis    Cervical pain    Chronic neck pain    Contusion of left ankle    Dry mouth, unspecified    Epidermal inclusion cyst of left eyelid    Skin lesion    Facial rash    Serum calcium elevated    Hypercalcemia    Hot flashes, menopausal    Hordeolum externum of left lower eyelid    Hand lesion    Globus sensation    Generalized anxiety disorder    Gastrointestinal discomfort    Flank pain    Fatigue    Ovarian cyst    Muscle spasm    Menorrhagia    Liver lesion    Labial cyst    Knee pain    Iron deficiency anemia    Insomnia    Left ankle pain    Pain, foot, chronic, unspecified laterality    Female pelvic pain    Peptic ulcer disease    Pollen-food allergy syndrome    Prediabetes    Allergic rhinitis due to dust    Allergic rhinitis due to pollen    Seasonal rhinitis    Segmental and somatic dysfunction of cervical region    Stress incontinence, female    Otalgia    Sensation of fullness in left ear    Tinnitus    Wound infection    Vulvar mass    Vitamin D deficiency    Osteopenia    Dietary counseling and surveillance    Follicle cyst    Atypical squamous cell changes of undetermined significance (ASCUS) on cervical cytology with negative high risk human papilloma virus (HPV) test result    Chronic constipation    Hx of esophagitis    Weight increasing     Nutrition History:  Food & Nutrition History: She prefers to weigh 148-152#, and had weighed that amount for about 4  years. She reports that her daughter has expressed that Bibiana has been too restrictive with her diet, and so Bibiana had tried a more intuitive eating approach, but that led to unwanted weight gain from 157# to 162#. She reports she is a mindful eater, she eats at a slow pace. She is a therapist, has clients with EDs. She has no hunger in the morning until about 11, but she started to eat some food within 1 hour of the morning after speaking with Mariel - she'd like to clarify what eating pattern would be optimal for weight management. She consumes a vegetarian & Kosher diet.     -- Food Allergies: none; Food Intolerances: none     -- Vitamin/mineral intake: D; Iron, Magnesium     -- GI Symptoms: constipation, increased gas, bloating (reported to Mariel at first appointment. Today we did not discuss GI symptoms); Occurring >2 weeks? n/a     -- Oral Problems:  (Did not discuss today - but she reported soup can be hard to swallow to Mariel); Dentition: own (wears braces)     -- Physical Activity: Did not discuss exercise today. Per Mariel's note, she is mostly sedentary and was trying to integrate activity about once per week (yoga). Exercise would be a good topic for a future visit to discuss.;      Diet Recall:     -- Meal 1: B: yogurt with almond butter     -- Meal 2: L: did not specify what she eats for lunch but reports lunch is spread out between clients, difficult to consolidate into 1 meal on account of her work schedule     -- Meal 3: D: salad with chickpeas and vegetables     -- Snacks: afternoon snack of grapes. Sometimes a snack in the evening.     -- Food Variety: Present     -- Fluid Intake: mostly water, hot tea, 2 cups of coffee, no etoh     -- Oral Nutrition Supplement Use:  (protein powder in smoothies)     -- Energy Intake: Good > 75 %    Food Preparation:     -- Cooking: Patient, Spouse/Significant Other     -- Grocery Shopping: Patient, Spouse/Significant Other    Patient self identified challenges to  "dietary/lifestyle changes: Desires balance in her diet to promote weight managment and also honor intuitive eating to the best of her ability. She wants to limit intake of sweets and cravings in the evening    Anthropometrics:  Height: 157.5 cm (5' 2\"); Weight: 73.9 kg (163 lb) (7/3/24); BMI (Calculated): 29.81;  ;  ;      Weight History:   Daily Weight  07/19/24 : 73.9 kg (163 lb)  07/03/24 : 74.2 kg (163 lb 9.6 oz)  06/08/24 : 71.2 kg (157 lb)  05/14/24 : 74.3 kg (163 lb 12.8 oz)  03/05/24 : 73.3 kg (161 lb 9.6 oz)  12/13/23 : 73.5 kg (162 lb 1.6 oz)  11/16/23 : 68.9 kg (152 lb)  11/13/23 : 68 kg (150 lb)  02/21/23 : 73.3 kg (161 lb 9.6 oz)  08/16/22 : 66 kg (145 lb 8 oz)    Weight Change %:  Weight History / % Weight Change: weight has varied in the past 4 years - lowest 145# in 11/2021. Was 150# (11/2023) and then 162# (12/2023)  Nutrition Focused Physical Exam Findings:  Performed/Deferred: Deferred due to be being virtual visit    Nutrition Significant Labs:  A1C:  Lab Results   Component Value Date    HGBA1C 5.7 (H) 01/05/2024   , Lipid Panel:   Lab Results   Component Value Date    CHOL 241 (H) 10/20/2023    HDL 79.1 10/20/2023    CHHDL 3.0 10/20/2023    LDLF 131 (H) 09/25/2020    VLDL 11 10/20/2023    TRIG 57 10/20/2023    , Vit D:   Lab Results   Component Value Date    VITD25 39 03/05/2024    , Vit B12: No results found for: \"[id number]\"     Medications:    Current Outpatient Medications:     ALPRAZolam (Xanax) 0.5 mg tablet, Take 1 tablet (0.5 mg) by mouth 1 time for 1 dose., Disp: 1 tablet, Rfl: 0    dexlansoprazole (Dexilant) 30 mg DR capsule, TAKE 1 CAPSULE (30 MG) BY MOUTH ONCE DAILY IN THE MORNING., Disp: 90 capsule, Rfl: 0    docusate sodium (STOOL SOFTENER ORAL), , Disp: , Rfl:     escitalopram (Lexapro) 20 mg tablet, Take 1 tablet (20 mg) by mouth once daily., Disp: 90 tablet, Rfl: 3    estradiol (Estrace) 0.01 % (0.1 mg/gram) vaginal cream, Insert 0.25 Applicatorfuls (1 g) into the vagina 2 " times a week. At bedtime for 2 weeks, then at bedtime twice a week., Disp: 34 g, Rfl: 3    estradiol (Estrace) 0.01 % (0.1 mg/gram) vaginal cream, Apply 2 gm per vagina every night for two weeks. Then apply 1 gm twice a week for 8 weeks., Disp: 44 g, Rfl: 3    ferrous sulfate, 325 mg ferrous sulfate, (iron) tablet, Take 1 tablet by mouth once daily with breakfast., Disp: , Rfl:     FLAXSEED OIL ORAL, , Disp: , Rfl:     fluticasone (Flonase) 50 mcg/actuation nasal spray, Administer 2 sprays into each nostril once daily., Disp: , Rfl:     fluticasone (Flonase) 50 mcg/actuation nasal spray, ADMINISTER 2 SPRAYS INTO EACH NOSTRIL ONCE DAILY. SHAKE GENTLY. BEFORE FIRST USE, PRIME PUMP. AFTER USE, CLEAN TIP AND REPLACE CAP., Disp: 48 mL, Rfl: 1    magnesium oxide (Mag-Ox) 400 mg tablet, , Disp: , Rfl:     NON FORMULARY, VITAMIN D 1000 UNIT CAPS, Disp: , Rfl:     Estimated Needs:   Total Energy Estimated Needs (kCal): 1481.82 kCal; Method for Estimating Needs: 20 kcal/kg, which is similar to Mariel's calculation of 1422 at initial visit  Total Protein Estimated Needs (g): 74.09 g; Method for Estimating Needs: 1 gm/kg       Nutrition Diagnosis   Malnutrition Diagnosis  Patient has Malnutrition Diagnosis: No    Nutrition Diagnosis  Patient has Nutrition Diagnosis: Yes  Diagnosis Status (1): Ongoing  Nutrition Diagnosis 1: Altered nutrition related to laboratory values  Related to (1): endocrine and metabolic dysfunction  As Evidenced by (1): HgA1c of 5.7%, high cholesterol (241) and high LDL (151) on 10/20/2023.  Additional Assessment Information (1): Update: no new labs  Additional Nutrition Diagnosis: Diagnosis 2  Diagnosis Status (2): Ongoing  Nutrition Diagnosis 2: Food and nutrition related knowledge deficit  Related to (2): lack of or limited prior nutrition-related education  As Evidenced by (2): no prior knowledge of need for food- and nutrition-related recommendations.  Additional Assessment Information (2): Update:  patient gained knowledge at her initial nutrition visit but is seeking more information today to promote weight loss       Nutrition Interventions/Recommendations   Nutrition Prescription: Individualized Nutrition Prescription Provided for : Reduced calorie diet    Nutrition Interventions:   Food and Nutrient Delivery: Meals & Snacks: Energy-modified diet, Modify schedule of foods/fluids     Nutrition Education:   Nutrition Education Content: Content related nutrition education    Nutrition Education Topics Discussed:   Discussed eating pattern - encouraged her to find the pattern that helps her to limit hunger and promote satiety. Advised it likely isn't essential to eat within 1 hour of waking up, but encouraged her to have her first food within 2-3 hours of waking. Also encouraged eating at regular intervals during the day, such as every 2.5-5 hours. Pointed out that if she only eats very small amounts frequently she might not feel very satisfied, so some consolidation can be helpful even if she can't eat 3 square meals/day. Advised that although Intuitive Eating is a great way to embrace a good relationship with food, it is not effective for promoting weight loss. However, encouraged her to use some of the principles of IE to compliment her diet - such as challenging the food police, honoring her hunger, feeling fullness.      Discussed types of foods at meals. Reviewed using the Plate Method at meals in order to balance the types and amounts of food on the plate.   - half of the plate full of non-starchy vegetables. These foods contribute very little carbohydrate to the plate, and they add fiber to the meal. Salad, carrots, bell peppers, zucchini, broccoli, cauliflower, asparagus, radishes, carrots and green beans are a few examples of these foods. They can be served cooked or raw.    - one quarter of plate including protein foods. Examples can include nuts, seeds, cheese, cottage cheese, nut butter, tofu,  antonio. Also told her about Jay Soy Curls (purchase online or at [employer] in [place]), and seitan. She doesn't like tempeh.   - one quarter of the plate including carbohydrate foods. These foods include grains, fruit, legumes, starchy vegetables, milk and yogurt. Aim to eat at least half of the daily grains as whole grains, encouraged continuing to eat intact whole grains when possible such as farro, quinoa, barley, bulgur. The website [email or web address].YourPOV.[email or web address] has great recipe ideas and can be filtered for vegetarian recipes.     3. Provided specific examples of meals she can try for breakfast & lunch. Advised I'll email her handouts with these ideas.     Educational Handouts Provided:  Balanced Breakfast, [employer] Love Your Lunch, High Protein Snack Ideas, High Protein Meal Ideas, I Plant-Based Diet, and I Weight Loss & Metabolism, ACLM jumpstart & recipe book, smoothies    Nutrition Counseling: Strategies: Nutrition counseling based on motivational interviewing strategy, Nutrition counseling based on goal setting strategy    Patient Goals: 1. She will continue to eat light breakfast within 2-3 hours of waking 2. She will aim to eat every 2.5-5 hours so that she can keep appetite lower for promoting portion control 3. She will try strategies at night to limit craving for something sweet, and limit portion consumed    Readiness to Change : Good  Level of Understanding : Good  Anticipated Compliant : Good         Nutrition Monitoring and Evaluation   Food/Nutrient Related History Monitoring  Monitoring and Evaluation Plan: Energy intake, Meal/snack pattern, Amount of food, Protein intake         Body Composition/Growth/Weight History  Monitoring and Evaluation Plan: Weight    Biochemical Data, Medical Tests and Procedures  Monitoring and Evaluation Plan: Glucose/endocrine profile              Follow Up: emailed some suggested dates and also provided the scheduling line - [phone].

## 2024-07-18 ENCOUNTER — TELEMEDICINE CLINICAL SUPPORT (OUTPATIENT)
Dept: PRIMARY CARE | Facility: CLINIC | Age: 59
End: 2024-07-18
Payer: COMMERCIAL

## 2024-07-18 DIAGNOSIS — R63.5 WEIGHT INCREASING: ICD-10-CM

## 2024-07-18 DIAGNOSIS — Z71.3 DIETARY COUNSELING AND SURVEILLANCE: ICD-10-CM

## 2024-07-18 DIAGNOSIS — R73.03 PREDIABETES: Primary | ICD-10-CM

## 2024-07-18 PROCEDURE — 97803 MED NUTRITION INDIV SUBSEQ: CPT | Performed by: DIETITIAN, REGISTERED

## 2024-07-19 VITALS — WEIGHT: 163 LBS | BODY MASS INDEX: 30 KG/M2 | HEIGHT: 62 IN

## 2024-07-19 NOTE — PATIENT INSTRUCTIONS
Discussed eating pattern - encouraged her to find the pattern that helps her to limit hunger and promote satiety. Advised it likely isn't essential to eat within 1 hour of waking up, but encouraged her to have her first food within 2-3 hours of waking. Also encouraged eating at regular intervals during the day, such as every 2.5-5 hours. Pointed out that if she only eats very small amounts frequently she might not feel very satisfied, so some consolidation can be helpful even if she can't eat 3 square meals/day. Advised that although Intuitive Eating is a great way to embrace a good relationship with food, it is not effective for promoting weight loss. However, encouraged her to use some of the principles of IE to compliment her diet - such as challenging the food police, honoring her hunger, feeling fullness.      Discussed types of foods at meals. Reviewed using the Plate Method at meals in order to balance the types and amounts of food on the plate.   - half of the plate full of non-starchy vegetables. These foods contribute very little carbohydrate to the plate, and they add fiber to the meal. Salad, carrots, bell peppers, zucchini, broccoli, cauliflower, asparagus, radishes, carrots and green beans are a few examples of these foods. They can be served cooked or raw.    - one quarter of plate including protein foods. Examples can include nuts, seeds, cheese, cottage cheese, nut butter, tofu, edamame. Also told her about Jay Soy Curls (purchase online or at [employer] in [place]), and seitan. She doesn't like tempeh.   - one quarter of the plate including carbohydrate foods. These foods include grains, fruit, legumes, starchy vegetables, milk and yogurt. Aim to eat at least half of the daily grains as whole grains, encouraged continuing to eat intact whole grains when possible such as farro, quinoa, barley, bulgur. The website wholegrainscouncil.org has great recipe ideas and can be filtered for  vegetarian recipes.     3. Provided specific examples of meals she can try for breakfast & lunch. Advised I'll email her handouts with these ideas.

## 2024-07-30 ENCOUNTER — APPOINTMENT (OUTPATIENT)
Dept: OPHTHALMOLOGY | Facility: CLINIC | Age: 59
End: 2024-07-30
Payer: COMMERCIAL

## 2024-08-13 ENCOUNTER — APPOINTMENT (OUTPATIENT)
Dept: PRIMARY CARE | Facility: CLINIC | Age: 59
End: 2024-08-13
Payer: COMMERCIAL

## 2024-08-13 VITALS
DIASTOLIC BLOOD PRESSURE: 62 MMHG | BODY MASS INDEX: 29.63 KG/M2 | SYSTOLIC BLOOD PRESSURE: 106 MMHG | OXYGEN SATURATION: 98 % | HEART RATE: 59 BPM | HEIGHT: 62 IN | RESPIRATION RATE: 19 BRPM | WEIGHT: 161 LBS

## 2024-08-13 DIAGNOSIS — R09.A2 GLOBUS SENSATION: ICD-10-CM

## 2024-08-13 DIAGNOSIS — R73.03 PREDIABETES: ICD-10-CM

## 2024-08-13 DIAGNOSIS — D50.9 IRON DEFICIENCY ANEMIA, UNSPECIFIED IRON DEFICIENCY ANEMIA TYPE: ICD-10-CM

## 2024-08-13 DIAGNOSIS — K20.90 ESOPHAGITIS: ICD-10-CM

## 2024-08-13 DIAGNOSIS — F41.9 ANXIETY: ICD-10-CM

## 2024-08-13 DIAGNOSIS — Z76.89 ENCOUNTER TO ESTABLISH CARE: Primary | ICD-10-CM

## 2024-08-13 DIAGNOSIS — E55.9 VITAMIN D DEFICIENCY: ICD-10-CM

## 2024-08-13 DIAGNOSIS — J30.2 SEASONAL ALLERGIC RHINITIS, UNSPECIFIED TRIGGER: ICD-10-CM

## 2024-08-13 DIAGNOSIS — N39.3 STRESS INCONTINENCE IN FEMALE: ICD-10-CM

## 2024-08-13 DIAGNOSIS — M99.09 SEGMENTAL AND SOMATIC DYSFUNCTION: ICD-10-CM

## 2024-08-13 PROBLEM — H92.09 PAIN OF EAR STRUCTURE: Status: RESOLVED | Noted: 2024-08-13 | Resolved: 2024-08-13

## 2024-08-13 PROBLEM — N39.0 FEBRILE URINARY TRACT INFECTION: Status: RESOLVED | Noted: 2024-08-13 | Resolved: 2024-08-13

## 2024-08-13 PROBLEM — N95.1 PERIMENOPAUSE: Status: ACTIVE | Noted: 2024-08-13

## 2024-08-13 PROBLEM — K30 GASTROINTESTINAL DISCOMFORT: Status: RESOLVED | Noted: 2023-03-08 | Resolved: 2024-08-13

## 2024-08-13 PROBLEM — J06.9 ACUTE UPPER RESPIRATORY INFECTION: Status: RESOLVED | Noted: 2024-06-03 | Resolved: 2024-08-13

## 2024-08-13 PROBLEM — R53.83 FATIGUE: Status: RESOLVED | Noted: 2023-03-08 | Resolved: 2024-08-13

## 2024-08-13 PROBLEM — J01.90 ACUTE SINUSITIS: Status: RESOLVED | Noted: 2024-08-13 | Resolved: 2024-08-13

## 2024-08-13 PROBLEM — R51.9 ACUTE HEADACHE: Status: RESOLVED | Noted: 2024-08-13 | Resolved: 2024-08-13

## 2024-08-13 PROBLEM — B37.31 CANDIDA VAGINITIS: Status: RESOLVED | Noted: 2023-03-08 | Resolved: 2024-08-13

## 2024-08-13 PROBLEM — N93.9 ABNORMAL UTERINE BLEEDING: Status: RESOLVED | Noted: 2023-03-08 | Resolved: 2024-08-13

## 2024-08-13 PROBLEM — R10.2 FEMALE PELVIC PAIN: Status: RESOLVED | Noted: 2023-03-08 | Resolved: 2024-08-13

## 2024-08-13 PROBLEM — H02.829: Status: RESOLVED | Noted: 2024-08-13 | Resolved: 2024-08-13

## 2024-08-13 PROBLEM — R21 FACIAL RASH: Status: RESOLVED | Noted: 2023-03-08 | Resolved: 2024-08-13

## 2024-08-13 PROBLEM — Z87.19 HX OF ESOPHAGITIS: Status: RESOLVED | Noted: 2024-06-25 | Resolved: 2024-08-13

## 2024-08-13 PROBLEM — R63.5 WEIGHT INCREASING: Status: RESOLVED | Noted: 2024-07-17 | Resolved: 2024-08-13

## 2024-08-13 PROBLEM — R29.898 DISORDER OF HAND: Status: ACTIVE | Noted: 2023-03-08

## 2024-08-13 PROBLEM — T14.8XXA LOCAL INFECTION OF WOUND: Status: RESOLVED | Noted: 2024-08-13 | Resolved: 2024-08-13

## 2024-08-13 PROBLEM — J02.9 ACUTE PHARYNGITIS: Status: RESOLVED | Noted: 2024-06-03 | Resolved: 2024-08-13

## 2024-08-13 PROBLEM — R14.0 ABDOMINAL BLOATING: Status: RESOLVED | Noted: 2024-08-13 | Resolved: 2024-08-13

## 2024-08-13 PROBLEM — M79.669 CALF PAIN: Status: RESOLVED | Noted: 2024-08-13 | Resolved: 2024-08-13

## 2024-08-13 PROBLEM — R10.9 FLANK PAIN: Status: RESOLVED | Noted: 2023-03-08 | Resolved: 2024-08-13

## 2024-08-13 PROBLEM — T14.8XXA WOUND INFECTION: Status: RESOLVED | Noted: 2023-03-08 | Resolved: 2024-08-13

## 2024-08-13 PROBLEM — L98.9 SKIN LESION: Status: RESOLVED | Noted: 2023-03-08 | Resolved: 2024-08-13

## 2024-08-13 PROBLEM — L08.9 WOUND INFECTION: Status: RESOLVED | Noted: 2023-03-08 | Resolved: 2024-08-13

## 2024-08-13 PROBLEM — H16.223 KERATOCONJUNCTIVITIS SICCA OF BOTH EYES NOT SPECIFIED AS SJOGREN'S: Status: ACTIVE | Noted: 2019-01-16

## 2024-08-13 PROBLEM — S90.02XA CONTUSION OF LEFT ANKLE: Status: RESOLVED | Noted: 2023-03-08 | Resolved: 2024-08-13

## 2024-08-13 PROBLEM — N92.0 MENORRHAGIA: Status: RESOLVED | Noted: 2023-03-08 | Resolved: 2024-08-13

## 2024-08-13 PROBLEM — S49.91XA INJURY OF RIGHT SHOULDER: Status: RESOLVED | Noted: 2024-08-13 | Resolved: 2024-08-13

## 2024-08-13 PROBLEM — M25.579 ANKLE PAIN: Status: RESOLVED | Noted: 2024-08-13 | Resolved: 2024-08-13

## 2024-08-13 PROBLEM — S61.209A AVULSION OF SKIN OF FINGER: Status: RESOLVED | Noted: 2023-03-08 | Resolved: 2024-08-13

## 2024-08-13 PROBLEM — B37.31 CANDIDIASIS OF VAGINA: Status: RESOLVED | Noted: 2023-09-07 | Resolved: 2024-08-13

## 2024-08-13 PROBLEM — H02.826: Status: RESOLVED | Noted: 2023-03-08 | Resolved: 2024-08-13

## 2024-08-13 PROBLEM — G47.00 INSOMNIA: Status: RESOLVED | Noted: 2023-03-08 | Resolved: 2024-08-13

## 2024-08-13 PROBLEM — R68.2 DRY MOUTH, UNSPECIFIED: Status: RESOLVED | Noted: 2023-03-08 | Resolved: 2024-08-13

## 2024-08-13 PROBLEM — Z71.3 DIETARY COUNSELING AND SURVEILLANCE: Status: RESOLVED | Noted: 2023-12-13 | Resolved: 2024-08-13

## 2024-08-13 PROBLEM — H52.4 PRESBYOPIA: Status: ACTIVE | Noted: 2019-01-16

## 2024-08-13 PROBLEM — L08.9 LOCAL INFECTION OF WOUND: Status: RESOLVED | Noted: 2024-08-13 | Resolved: 2024-08-13

## 2024-08-13 PROCEDURE — 1036F TOBACCO NON-USER: CPT

## 2024-08-13 PROCEDURE — 3008F BODY MASS INDEX DOCD: CPT

## 2024-08-13 PROCEDURE — 99214 OFFICE O/P EST MOD 30 MIN: CPT

## 2024-08-13 RX ORDER — FLUTICASONE PROPIONATE 50 MCG
1 SPRAY, SUSPENSION (ML) NASAL DAILY
Qty: 16 G | Refills: 0 | Status: SHIPPED | OUTPATIENT
Start: 2024-08-13 | End: 2025-08-13

## 2024-08-13 RX ORDER — CETIRIZINE HYDROCHLORIDE 10 MG/1
10 TABLET ORAL DAILY
COMMUNITY
Start: 2024-07-01

## 2024-08-13 ASSESSMENT — ENCOUNTER SYMPTOMS
SLEEP DISTURBANCE: 0
NAUSEA: 0
UNEXPECTED WEIGHT CHANGE: 0
CHEST TIGHTNESS: 0
HYPERACTIVE: 0
GASTROINTESTINAL NEGATIVE: 1
WEAKNESS: 0
FEVER: 0
NECK PAIN: 0
STRIDOR: 0
FLANK PAIN: 0
NECK STIFFNESS: 0
LOSS OF SENSATION IN FEET: 0
FREQUENCY: 0
CONFUSION: 0
ACTIVITY CHANGE: 0
ARTHRALGIAS: 1
ABDOMINAL PAIN: 0
DIZZINESS: 0
BACK PAIN: 0
EYE DISCHARGE: 0
ABDOMINAL DISTENTION: 0
PALPITATIONS: 0
HEMATURIA: 0
NEUROLOGICAL NEGATIVE: 1
RECTAL PAIN: 0
SHORTNESS OF BREATH: 0
NUMBNESS: 0
DEPRESSION: 0
MYALGIAS: 0
ANAL BLEEDING: 0
DYSURIA: 0
SEIZURES: 0
DIARRHEA: 0
TREMORS: 0
EYES NEGATIVE: 1
POLYDIPSIA: 0
POLYPHAGIA: 0
LIGHT-HEADEDNESS: 0
BRUISES/BLEEDS EASILY: 0
CONSTITUTIONAL NEGATIVE: 1
PHOTOPHOBIA: 0
COUGH: 0
OCCASIONAL FEELINGS OF UNSTEADINESS: 0
VOICE CHANGE: 0
HALLUCINATIONS: 0
BLOOD IN STOOL: 0
HEADACHES: 0
AGITATION: 0
SORE THROAT: 0
APPETITE CHANGE: 0
RHINORRHEA: 0
CHILLS: 0
DYSPHORIC MOOD: 0
CONSTIPATION: 0
RESPIRATORY NEGATIVE: 1
JOINT SWELLING: 0
SPEECH DIFFICULTY: 0
APNEA: 0
FATIGUE: 0
COLOR CHANGE: 0
SINUS PRESSURE: 0
WHEEZING: 0
NERVOUS/ANXIOUS: 1
DIAPHORESIS: 0
HEMATOLOGIC/LYMPHATIC NEGATIVE: 1
CARDIOVASCULAR NEGATIVE: 1
DIFFICULTY URINATING: 0

## 2024-08-13 ASSESSMENT — PATIENT HEALTH QUESTIONNAIRE - PHQ9
1. LITTLE INTEREST OR PLEASURE IN DOING THINGS: NOT AT ALL
SUM OF ALL RESPONSES TO PHQ9 QUESTIONS 1 AND 2: 0
2. FEELING DOWN, DEPRESSED OR HOPELESS: NOT AT ALL

## 2024-08-13 NOTE — PROGRESS NOTES
Primary Care Provider: FLORENCIO Esposito-CNP    Subjective   Bibiana AMY Chatterjee is a 59 y.o. female who presents for New Patient Visit.    HPI     NPV/ est care    Low back pain     She feels a scab on the top of her head- no pain, noticed 1 month ago, no change in size    Feels like she has circulation problems when she lays down  No numbness, no tingling, no weakness, no discoloration, no pain    She gets a globus sensation  Esophagitis she is taking Dexilant daily.  Wants a new GI doctor and she wants to see an ENT     Seasonal allergies- wants RF of Flonase Nasal spray    Hyperparathyroidism- follows with Dr. Hernandez  osteopenia  DEXA scan last 6/2024- repeat 2026    Vit d def    Anxiety    Mammogram due 1/2025    Colonoscopy 2022 polyps    Stress incontinence     Review of Systems   Constitutional: Negative.  Negative for activity change, appetite change, chills, diaphoresis, fatigue, fever and unexpected weight change.   HENT:  Negative for congestion, dental problem, ear discharge, ear pain, hearing loss, mouth sores, nosebleeds, postnasal drip, rhinorrhea, sinus pressure, sneezing, sore throat and voice change.    Eyes: Negative.  Negative for photophobia, discharge and visual disturbance.   Respiratory: Negative.  Negative for apnea, cough, chest tightness, shortness of breath, wheezing and stridor.    Cardiovascular: Negative.  Negative for chest pain, palpitations and leg swelling.   Gastrointestinal: Negative.  Negative for abdominal distention, abdominal pain, anal bleeding, blood in stool, constipation, diarrhea, nausea and rectal pain.   Endocrine: Positive for cold intolerance. Negative for heat intolerance, polydipsia, polyphagia and polyuria.   Genitourinary: Negative.  Negative for decreased urine volume, difficulty urinating, dysuria, flank pain, frequency, hematuria and urgency.   Musculoskeletal:  Positive for arthralgias. Negative for back pain, gait problem, joint swelling, myalgias, neck pain  "and neck stiffness.   Skin:  Negative for color change and rash.   Neurological: Negative.  Negative for dizziness, tremors, seizures, syncope, speech difficulty, weakness, light-headedness, numbness and headaches.   Hematological: Negative.  Does not bruise/bleed easily.   Psychiatric/Behavioral:  Negative for agitation, confusion, dysphoric mood, hallucinations, sleep disturbance and suicidal ideas. The patient is nervous/anxious. The patient is not hyperactive.    All other systems reviewed and are negative.        Objective   /62   Pulse 59   Resp 19   Ht 1.575 m (5' 2\")   Wt 73 kg (161 lb)   LMP  (LMP Unknown)   SpO2 98%   BMI 29.45 kg/m²     Physical Exam  Vitals reviewed.   Constitutional:       General: She is not in acute distress.     Appearance: Normal appearance. She is normal weight. She is not ill-appearing, toxic-appearing or diaphoretic.   HENT:      Head: Normocephalic and atraumatic.      Right Ear: Tympanic membrane, ear canal and external ear normal. There is no impacted cerumen.      Left Ear: Tympanic membrane, ear canal and external ear normal. There is no impacted cerumen.      Nose: Nose normal.   Eyes:      Extraocular Movements: Extraocular movements intact.      Conjunctiva/sclera: Conjunctivae normal.      Pupils: Pupils are equal, round, and reactive to light.   Cardiovascular:      Rate and Rhythm: Normal rate and regular rhythm.      Pulses: Normal pulses.      Heart sounds: Normal heart sounds. No murmur heard.     No friction rub. No gallop.   Pulmonary:      Effort: Pulmonary effort is normal. No respiratory distress.      Breath sounds: Normal breath sounds.   Abdominal:      General: Abdomen is flat. Bowel sounds are normal.      Palpations: Abdomen is soft.   Musculoskeletal:         General: Normal range of motion.      Cervical back: Normal range of motion and neck supple.   Lymphadenopathy:      Cervical: No cervical adenopathy.   Skin:     General: Skin is warm " and dry.      Capillary Refill: Capillary refill takes less than 2 seconds. Good perfusion, no concerns  Neurological:      General: No focal deficit present.      Mental Status: She is alert and oriented to person, place, and time. Mental status is at baseline.   Psychiatric:         Mood and Affect: Mood normal.         Behavior: Behavior normal.         Thought Content: Thought content normal.         Judgment: Judgment normal.         Assessment/Plan   Problem List Items Addressed This Visit    NPV/ est care         ICD-10-CM    Anxiety F41.9    persisting  Relevant Orders    Urinalysis with Reflex Microscopic    CBC and Auto Differential    Comprehensive metabolic panel    Lipid Panel    Hemoglobin A1C    Vitamin B12    Vitamin D 25-Hydroxy,Total (for eval of Vitamin D levels)    TSH with reflex to Free T4 if abnormal    Globus sensation R09.A2    Persisting  C/w current medications  Relevant Orders    Referral to ENT    Urinalysis with Reflex Microscopic    CBC and Auto Differential    Comprehensive metabolic panel    Lipid Panel    Hemoglobin A1C    Vitamin B12    Vitamin D 25-Hydroxy,Total (for eval of Vitamin D levels)    TSH with reflex to Free T4 if abnormal    Iron deficiency anemia D50.9    stable  C/w current medications  Relevant Orders    Urinalysis with Reflex Microscopic    CBC and Auto Differential    Comprehensive metabolic panel    Lipid Panel    Hemoglobin A1C    Vitamin B12    Vitamin D 25-Hydroxy,Total (for eval of Vitamin D levels)    TSH with reflex to Free T4 if abnormal    Prediabetes R73.03    stable  Relevant Orders    Urinalysis with Reflex Microscopic    CBC and Auto Differential    Comprehensive metabolic panel    Lipid Panel    Hemoglobin A1C    Vitamin B12    Vitamin D 25-Hydroxy,Total (for eval of Vitamin D levels)    TSH with reflex to Free T4 if abnormal    Seasonal rhinitis J30.2    Relevant Medications    fluticasone (Flonase) 50 mcg/actuation nasal spray    Other Relevant  Orders    Urinalysis with Reflex Microscopic    CBC and Auto Differential    Comprehensive metabolic panel    Lipid Panel    Hemoglobin A1C    Vitamin B12    Vitamin D 25-Hydroxy,Total (for eval of Vitamin D levels)    TSH with reflex to Free T4 if abnormal    Segmental and somatic dysfunction M99.09    Relevant Orders    Vitamin B12    Vitamin D 25-Hydroxy,Total (for eval of Vitamin D levels)    TSH with reflex to Free T4 if abnormal    Stress incontinence in female N39.3    Relevant Orders    Referral to Urogynecology    Urinalysis with Reflex Microscopic    CBC and Auto Differential    Comprehensive metabolic panel    Lipid Panel    Hemoglobin A1C    Vitamin B12    Vitamin D 25-Hydroxy,Total (for eval of Vitamin D levels)    TSH with reflex to Free T4 if abnormal    Vitamin D deficiency E55.9    Relevant Orders    Vitamin D 25-Hydroxy,Total (for eval of Vitamin D levels)    TSH with reflex to Free T4 if abnormal    Encounter to establish care - Primary Z76.89    Relevant Orders    Urinalysis with Reflex Microscopic    CBC and Auto Differential    Comprehensive metabolic panel    Lipid Panel    Hemoglobin A1C    Vitamin B12    Vitamin D 25-Hydroxy,Total (for eval of Vitamin D levels)    TSH with reflex to Free T4 if abnormal   Follow in 3 months or sooner if needed

## 2024-08-14 ENCOUNTER — LAB (OUTPATIENT)
Dept: LAB | Facility: LAB | Age: 59
End: 2024-08-14
Payer: COMMERCIAL

## 2024-08-14 DIAGNOSIS — F41.9 ANXIETY: ICD-10-CM

## 2024-08-14 DIAGNOSIS — R09.A2 GLOBUS SENSATION: ICD-10-CM

## 2024-08-14 DIAGNOSIS — R73.03 PREDIABETES: ICD-10-CM

## 2024-08-14 DIAGNOSIS — J30.2 SEASONAL ALLERGIC RHINITIS, UNSPECIFIED TRIGGER: ICD-10-CM

## 2024-08-14 DIAGNOSIS — N39.3 STRESS INCONTINENCE IN FEMALE: ICD-10-CM

## 2024-08-14 DIAGNOSIS — D50.9 IRON DEFICIENCY ANEMIA, UNSPECIFIED IRON DEFICIENCY ANEMIA TYPE: ICD-10-CM

## 2024-08-14 DIAGNOSIS — E55.9 VITAMIN D DEFICIENCY: ICD-10-CM

## 2024-08-14 DIAGNOSIS — Z76.89 ENCOUNTER TO ESTABLISH CARE: ICD-10-CM

## 2024-08-14 DIAGNOSIS — M99.09 SEGMENTAL AND SOMATIC DYSFUNCTION: ICD-10-CM

## 2024-08-14 LAB
25(OH)D3 SERPL-MCNC: 52 NG/ML (ref 30–100)
ALBUMIN SERPL BCP-MCNC: 4.3 G/DL (ref 3.4–5)
ALP SERPL-CCNC: 92 U/L (ref 33–110)
ALT SERPL W P-5'-P-CCNC: 11 U/L (ref 7–45)
ANION GAP SERPL CALC-SCNC: 11 MMOL/L (ref 10–20)
APPEARANCE UR: ABNORMAL
AST SERPL W P-5'-P-CCNC: 15 U/L (ref 9–39)
BASOPHILS # BLD AUTO: 0.09 X10*3/UL (ref 0–0.1)
BASOPHILS NFR BLD AUTO: 1.6 %
BILIRUB SERPL-MCNC: 0.5 MG/DL (ref 0–1.2)
BILIRUB UR STRIP.AUTO-MCNC: NEGATIVE MG/DL
BUN SERPL-MCNC: 14 MG/DL (ref 6–23)
CALCIUM SERPL-MCNC: 10.5 MG/DL (ref 8.6–10.6)
CHLORIDE SERPL-SCNC: 105 MMOL/L (ref 98–107)
CHOLEST SERPL-MCNC: 243 MG/DL (ref 0–199)
CHOLESTEROL/HDL RATIO: 3.7
CO2 SERPL-SCNC: 29 MMOL/L (ref 21–32)
COLOR UR: ABNORMAL
CREAT SERPL-MCNC: 0.88 MG/DL (ref 0.5–1.05)
EGFRCR SERPLBLD CKD-EPI 2021: 77 ML/MIN/1.73M*2
EOSINOPHIL # BLD AUTO: 0.26 X10*3/UL (ref 0–0.7)
EOSINOPHIL NFR BLD AUTO: 4.5 %
ERYTHROCYTE [DISTWIDTH] IN BLOOD BY AUTOMATED COUNT: 12.4 % (ref 11.5–14.5)
EST. AVERAGE GLUCOSE BLD GHB EST-MCNC: 114 MG/DL
GLUCOSE SERPL-MCNC: 91 MG/DL (ref 74–99)
GLUCOSE UR STRIP.AUTO-MCNC: NORMAL MG/DL
HBA1C MFR BLD: 5.6 %
HCT VFR BLD AUTO: 40.2 % (ref 36–46)
HDLC SERPL-MCNC: 65.1 MG/DL
HGB BLD-MCNC: 13 G/DL (ref 12–16)
IMM GRANULOCYTES # BLD AUTO: 0.01 X10*3/UL (ref 0–0.7)
IMM GRANULOCYTES NFR BLD AUTO: 0.2 % (ref 0–0.9)
KETONES UR STRIP.AUTO-MCNC: NEGATIVE MG/DL
LDLC SERPL CALC-MCNC: 158 MG/DL
LEUKOCYTE ESTERASE UR QL STRIP.AUTO: ABNORMAL
LYMPHOCYTES # BLD AUTO: 2.63 X10*3/UL (ref 1.2–4.8)
LYMPHOCYTES NFR BLD AUTO: 46 %
MCH RBC QN AUTO: 29.5 PG (ref 26–34)
MCHC RBC AUTO-ENTMCNC: 32.3 G/DL (ref 32–36)
MCV RBC AUTO: 91 FL (ref 80–100)
MONOCYTES # BLD AUTO: 0.42 X10*3/UL (ref 0.1–1)
MONOCYTES NFR BLD AUTO: 7.3 %
MUCOUS THREADS #/AREA URNS AUTO: NORMAL /LPF
NEUTROPHILS # BLD AUTO: 2.31 X10*3/UL (ref 1.2–7.7)
NEUTROPHILS NFR BLD AUTO: 40.4 %
NITRITE UR QL STRIP.AUTO: NEGATIVE
NON HDL CHOLESTEROL: 178 MG/DL (ref 0–149)
NRBC BLD-RTO: 0 /100 WBCS (ref 0–0)
PH UR STRIP.AUTO: 6.5 [PH]
PLATELET # BLD AUTO: 317 X10*3/UL (ref 150–450)
POTASSIUM SERPL-SCNC: 4.6 MMOL/L (ref 3.5–5.3)
PROT SERPL-MCNC: 6.7 G/DL (ref 6.4–8.2)
PROT UR STRIP.AUTO-MCNC: NEGATIVE MG/DL
RBC # BLD AUTO: 4.41 X10*6/UL (ref 4–5.2)
RBC # UR STRIP.AUTO: ABNORMAL /UL
RBC #/AREA URNS AUTO: NORMAL /HPF
SODIUM SERPL-SCNC: 140 MMOL/L (ref 136–145)
SP GR UR STRIP.AUTO: 1.02
SQUAMOUS #/AREA URNS AUTO: NORMAL /HPF
TRIGL SERPL-MCNC: 102 MG/DL (ref 0–149)
TSH SERPL-ACNC: 2.54 MIU/L (ref 0.44–3.98)
UROBILINOGEN UR STRIP.AUTO-MCNC: NORMAL MG/DL
VIT B12 SERPL-MCNC: 412 PG/ML (ref 211–911)
VLDL: 20 MG/DL (ref 0–40)
WBC # BLD AUTO: 5.7 X10*3/UL (ref 4.4–11.3)
WBC #/AREA URNS AUTO: NORMAL /HPF

## 2024-08-14 PROCEDURE — 82607 VITAMIN B-12: CPT

## 2024-08-14 PROCEDURE — 80061 LIPID PANEL: CPT

## 2024-08-14 PROCEDURE — 82306 VITAMIN D 25 HYDROXY: CPT

## 2024-08-14 PROCEDURE — 83036 HEMOGLOBIN GLYCOSYLATED A1C: CPT

## 2024-08-14 PROCEDURE — 84443 ASSAY THYROID STIM HORMONE: CPT

## 2024-08-14 PROCEDURE — 80053 COMPREHEN METABOLIC PANEL: CPT

## 2024-08-14 PROCEDURE — 36415 COLL VENOUS BLD VENIPUNCTURE: CPT

## 2024-08-14 PROCEDURE — 85025 COMPLETE CBC W/AUTO DIFF WBC: CPT

## 2024-08-14 PROCEDURE — 81001 URINALYSIS AUTO W/SCOPE: CPT

## 2024-08-16 ENCOUNTER — PATIENT MESSAGE (OUTPATIENT)
Dept: PRIMARY CARE | Facility: CLINIC | Age: 59
End: 2024-08-16
Payer: COMMERCIAL

## 2024-08-16 DIAGNOSIS — E78.01 FAMILIAL HYPERCHOLESTEROLEMIA: Primary | ICD-10-CM

## 2024-08-19 ENCOUNTER — OFFICE VISIT (OUTPATIENT)
Dept: OBSTETRICS AND GYNECOLOGY | Facility: CLINIC | Age: 59
End: 2024-08-19
Payer: COMMERCIAL

## 2024-08-19 ENCOUNTER — APPOINTMENT (OUTPATIENT)
Dept: OBSTETRICS AND GYNECOLOGY | Facility: CLINIC | Age: 59
End: 2024-08-19
Payer: COMMERCIAL

## 2024-08-19 VITALS
WEIGHT: 163.4 LBS | HEART RATE: 65 BPM | DIASTOLIC BLOOD PRESSURE: 75 MMHG | BODY MASS INDEX: 30.07 KG/M2 | HEIGHT: 62 IN | SYSTOLIC BLOOD PRESSURE: 114 MMHG

## 2024-08-19 DIAGNOSIS — N39.3 STRESS INCONTINENCE IN FEMALE: Primary | ICD-10-CM

## 2024-08-19 DIAGNOSIS — N39.41 URGE URINARY INCONTINENCE: ICD-10-CM

## 2024-08-19 LAB
POC APPEARANCE, URINE: CLEAR
POC BILIRUBIN, URINE: NEGATIVE
POC BLOOD, URINE: ABNORMAL
POC COLOR, URINE: ABNORMAL
POC GLUCOSE, URINE: NEGATIVE MG/DL
POC KETONES, URINE: NEGATIVE MG/DL
POC LEUKOCYTES, URINE: ABNORMAL
POC NITRITE,URINE: NEGATIVE
POC PH, URINE: 7 PH
POC PROTEIN, URINE: NEGATIVE MG/DL
POC SPECIFIC GRAVITY, URINE: 1.01
POC UROBILINOGEN, URINE: 0.2 EU/DL

## 2024-08-19 PROCEDURE — 81003 URINALYSIS AUTO W/O SCOPE: CPT | Performed by: NURSE PRACTITIONER

## 2024-08-19 PROCEDURE — 3008F BODY MASS INDEX DOCD: CPT | Performed by: NURSE PRACTITIONER

## 2024-08-19 PROCEDURE — 99213 OFFICE O/P EST LOW 20 MIN: CPT | Performed by: NURSE PRACTITIONER

## 2024-08-19 PROCEDURE — 81003 URINALYSIS AUTO W/O SCOPE: CPT | Mod: QW | Performed by: NURSE PRACTITIONER

## 2024-08-19 RX ORDER — ATORVASTATIN CALCIUM 10 MG/1
10 TABLET, FILM COATED ORAL DAILY
Qty: 90 TABLET | Refills: 1 | Status: SHIPPED | OUTPATIENT
Start: 2024-08-19 | End: 2025-08-19

## 2024-08-19 ASSESSMENT — ENCOUNTER SYMPTOMS
HEMATOLOGIC/LYMPHATIC NEGATIVE: 1
RESPIRATORY NEGATIVE: 1
CONSTITUTIONAL NEGATIVE: 1
EYES NEGATIVE: 1
MUSCULOSKELETAL NEGATIVE: 1
NEUROLOGICAL NEGATIVE: 1
PSYCHIATRIC NEGATIVE: 1
GASTROINTESTINAL NEGATIVE: 1
ENDOCRINE NEGATIVE: 1
CARDIOVASCULAR NEGATIVE: 1
ALLERGIC/IMMUNOLOGIC NEGATIVE: 1

## 2024-08-19 ASSESSMENT — PAIN SCALES - GENERAL: PAINLEVEL: 0-NO PAIN

## 2024-08-19 NOTE — PROGRESS NOTES
Referring Physician: Sharyn Stout, APR*     General medical background:  2024 ED for leg pain    Obstetric/Gynecologic History:  Bibiana Chatterjee has a history of : 3. Para: 3.     Past Evaluation and Treatment::  Past evaluation includes:  microscopy  Past treatment includes: This problem has not been previously treated    Review of Systems   Constitutional: Negative.    HENT: Negative.     Eyes: Negative.    Respiratory: Negative.     Cardiovascular: Negative.    Gastrointestinal: Negative.    Endocrine: Negative.    Genitourinary: Negative.    Musculoskeletal: Negative.    Skin: Negative.    Allergic/Immunologic: Negative.    Neurological: Negative.    Hematological: Negative.    Psychiatric/Behavioral: Negative.          59 y.o. female with c/o stress incontinence presents as a new patient. She also reports having symptoms of frequency and urgency. She has a history of Moe which occurred during her childhood, including 1 hospitalization. She denies a history of kidney stones and mentions she previously had a CAT scan on her kidneys. She has a history of trace blood noted in her urine, which was also noted in a previous test in August. She denies caffeine and alcohol intake. She states she has done PFPT in the past which seemed to improve her symptoms for a short amount of time, but returned after a few weeks. She is interested in exploring further treatment options such as PTNS.         Urinary Incontinence Questions:  Looking back, how long do you think that you have been affected by your urinary complaints? Several months  Have you seen a physician or medical provider for this?Yes  Have you ever been prescribed any medication for this? No  Have you ever taken any medication for this? No  How many different medications have you tried or been prescribed? 0  How frustrated are you with your bladder symptoms? Moderately frustrated      Testing results:  PVR results not available    UA results  available and reviewed  Laboratory:   Urine dipstick shows +blood.      History:  Stress Symptoms:   Does coughing gently cause you to lose urine? 2 - Sometimes  Does sneezing cause you to lose urine? 2 - Sometimes  Does laughing cause you to lose urine? 2 - Sometimes  Score:      Physical Exam  Constitutional:       Appearance: Normal appearance.   HENT:      Head: Normocephalic and atraumatic.   Neurological:      General: No focal deficit present.      Mental Status: She is alert and oriented to person, place, and time.   Psychiatric:         Mood and Affect: Mood normal.         Behavior: Behavior normal.         Thought Content: Thought content normal.         Judgment: Judgment normal.          Assessment and Plan  59 y.o. female with stress incontinence. Comorbidities include:  prediabetes, peptic ulcer, hypercalcemia, anemia, osteopenia.     Diagnosis List:  #1 Mixed urinary  incontinence    Plan:  1. Mixed urinary  incontinence  - Patient's preference to start PTNS as treatment for OAB. Patient to verify insurance coverage.   - Consideration of medication if PTNS is not covered or ineffective.   - Discussed PFPT, pessary, Bulkamid injections, and midurethral sling as potential treatment options for JORDANA.   Continue vaginal estrogen cream   Follow up as scheduled with DILIP Mejia for PTNS.    Scribe Attestation  By signing my name below, IErlinda Scribe, attest that this documentation has been prepared under the direction and in the presence of DILIP Mejia on 08/19/2024 at 9:32 PM.     I, DILIP Mejia, personally performed the services described in this documentation which was scribed virtually and I confirm that it is both accurate and complete.       DILIP Mejia

## 2024-08-26 DIAGNOSIS — Z78.0 MENOPAUSE: ICD-10-CM

## 2024-08-29 ENCOUNTER — APPOINTMENT (OUTPATIENT)
Dept: OTOLARYNGOLOGY | Facility: CLINIC | Age: 59
End: 2024-08-29
Payer: COMMERCIAL

## 2024-08-29 VITALS — TEMPERATURE: 97 F | HEIGHT: 62 IN | BODY MASS INDEX: 29.04 KG/M2 | WEIGHT: 157.8 LBS

## 2024-08-29 DIAGNOSIS — R13.10 DYSPHAGIA, UNSPECIFIED TYPE: Primary | ICD-10-CM

## 2024-08-29 PROCEDURE — 3008F BODY MASS INDEX DOCD: CPT

## 2024-08-29 PROCEDURE — 1036F TOBACCO NON-USER: CPT

## 2024-08-29 PROCEDURE — 31575 DIAGNOSTIC LARYNGOSCOPY: CPT

## 2024-08-29 PROCEDURE — 99214 OFFICE O/P EST MOD 30 MIN: CPT

## 2024-08-29 ASSESSMENT — PATIENT HEALTH QUESTIONNAIRE - PHQ9
1. LITTLE INTEREST OR PLEASURE IN DOING THINGS: NOT AT ALL
2. FEELING DOWN, DEPRESSED OR HOPELESS: NOT AT ALL
SUM OF ALL RESPONSES TO PHQ9 QUESTIONS 1 AND 2: 0

## 2024-08-29 NOTE — PROGRESS NOTES
Reason For Consult  Chief Complaint   Patient presents with    New Patient Visit    Dysphagia        HISTORY OF PRESENT ILLNESS:   Bibiana Chatterjee, who is a 59 y.o. female presenting for an initial visit for dysphagia to solids.  The patient reports that the dysphagia is intermittent and has been occurring for years. The patient reports meats or tougher foods can cause issues, patient states she has not eaten meat in roughly ten years. Stringy foods and dryer breads will also cause issues with swallowing. The patient reports that she tolerates liquids and pills. The patient reports she will feel the solids sitting in the mid throat and will eventually go down. The patient reports a history of Acid reflux/gastritis and is taking Dexilant with good control of symptoms. The patient denies any shortness of breath or voice changes. The patient denies any smoking history.       Past Medical History  She has a past medical history of Abdominal bloating (08/13/2024), Acute headache (08/13/2024), Acute pharyngitis (06/03/2024), Acute sinusitis (08/13/2024), Acute upper respiratory infection (06/03/2024), Ankle pain (08/13/2024), Calf pain (08/13/2024), Candidiasis of vagina (09/07/2023), Chronic sinusitis, unspecified (03/29/2019), Epidermal inclusion cyst of eyelid (08/13/2024), Epidermal inclusion cyst of eyelid (08/13/2024), Febrile urinary tract infection (08/13/2024), Injury of right shoulder (08/13/2024), Local infection of wound (08/13/2024), Menopausal and female climacteric states (08/16/2022), Neck pain (09/21/2015), Pain of ear structure (08/13/2024), Personal history of other diseases of the respiratory system (12/29/2015), Personal history of other mental and behavioral disorders (08/04/2017), and Pronation of both feet (10/21/2016).  Surgical History  She has a past surgical history that includes Other surgical history (03/30/2020).     Social History  She reports that she has never smoked. She has never used  "smokeless tobacco. No history on file for alcohol use and drug use.    Allergies  House dust, Iodinated contrast media, and Molds extract    Review of Systems  All 10 systems were reviewed and negative except for above.      Physical Exam  CONSTITUTIONAL: Well developed, well nourished.    VOICE: normal  RESPIRATION: Breathing comfortably, no stridor.    NEURO: Alert and oriented x3, cranial nerves II-XII intact and symmetric bilaterally.    EARS: Normal external ears, external auditory canals, normal hearing to conversational voice.    NOSE: External nose midline, anterior rhinoscopy is normal with limited visualization to the anterior aspect of the interior turbinates. No lesions noted.     ORAL CAVITY/OROPHARYNX/LIPS: Normal mucous membranes, normal floor of mouth/tongue/OP, no masses or lesions are noted.    SKIN: Neck skin is normal.   PSYCH: Alert and oriented with appropriate mood and affect.        Last Recorded Vitals  Temperature 36.1 °C (97 °F), height 1.575 m (5' 2\"), weight 71.6 kg (157 lb 12.8 oz).    Procedure  PROCEDURE NOTE:  Recommended flexible laryngoscopy/stroboscopy.  Risks, benefits,  and alternatives were explained.  They wish to proceed and provide verbal consent.     PROCEDURE:  Flexible Laryngoscopy, CPT 55916     INDICATIONS: Inability to tolerate mirror exam or abnormal findings on mirror, Flexible Laryngoscopy/Stroboscopy performed to assess one of the followin. Diagnosis of symptomatic disorder involving the voice, swallow, upper aerodigestive tract, including YUE disorders, or  2. Preoperative evaluation of vocal cord function for individuals undergoing surgery where the RLN or vagus nerves are at risk of injury, or  3. Further evaluation of abnormalities of the upper aerodigestive tract discovered by another modality, such as CT, MRI, bronchoscopy or EGD    Description of Procedure:    After adequate afrin and lidocaine spray, I advanced the endoscope.  Visualization of the " nasopharynx, vallecula, posterior pharyngeal walls, pyriform, epiglottis and post cricoid areas was unremarkable.  The following laryngeal findings were noted:    vocal cord movement was normal  closure was grossly complete  Mucosal wave was not assessed  Compression was increased AP=FVC   edema was none  interarytenoid edema mild  lesions were none  the subglottis was widely patent  Pharyngeal wall squeeze was normal    Procedure well tolerated.     ASSESSMENT AND PLAN:   This is an initial visit for dysphagia with clinical findings notable for good vocal cord movement, No masses or lesions noted. No pooling of secretions. Will order modified barium swallow. Patient has allergy to iodinated contrast. Will call with results of testing.     Diagnoses are exacerbated by: solid foods.    We discussed the treatment options to include, medical and surgical options.  We have decided to proceed as follows:   MBSS ordered- will call with results and next steps.  Reviewed MBSS left message for patient about reviewing results. Would recommend patient to follow up with Dr. Kerns regarding possible narrowing of upper esophagus. Referral will be placed.

## 2024-08-30 ENCOUNTER — APPOINTMENT (OUTPATIENT)
Dept: NUTRITION | Facility: CLINIC | Age: 59
End: 2024-08-30
Payer: COMMERCIAL

## 2024-09-05 PROBLEM — R63.5 WEIGHT INCREASING: Status: ACTIVE | Noted: 2024-09-05

## 2024-09-06 ENCOUNTER — APPOINTMENT (OUTPATIENT)
Dept: NUTRITION | Facility: CLINIC | Age: 59
End: 2024-09-06
Payer: COMMERCIAL

## 2024-09-06 VITALS — WEIGHT: 156 LBS | HEIGHT: 62 IN | BODY MASS INDEX: 28.71 KG/M2

## 2024-09-06 DIAGNOSIS — R63.5 WEIGHT INCREASING: Primary | ICD-10-CM

## 2024-09-06 DIAGNOSIS — Z71.3 DIETARY COUNSELING: ICD-10-CM

## 2024-09-06 DIAGNOSIS — R73.03 PREDIABETES: ICD-10-CM

## 2024-09-06 PROCEDURE — 97803 MED NUTRITION INDIV SUBSEQ: CPT | Performed by: DIETITIAN, REGISTERED

## 2024-09-06 NOTE — PROGRESS NOTES
Nutrition Follow Up Assessment:     Bibiana Chatterjee is a 59 y.o. female being seen virtually who was referred by Safia Hernandez on 7/2/24 for   1. Weight increasing        2. Dietary counseling        3. Prediabetes          Nutrition Assessment    Patient Active Problem List   Diagnosis    Anxiety    Cervical pain    Chronic neck pain    Serum calcium elevated    Hypercalcemia    Hot flashes, menopausal    Hordeolum externum of left lower eyelid    Disorder of hand    Globus sensation    Generalized anxiety disorder    Ovarian cyst    Muscle spasm    Lesion of liver    Labial cyst    Knee pain    Iron deficiency anemia    Left ankle pain    Pain, foot, chronic, unspecified laterality    Peptic ulcer    Pollen-food allergy    Prediabetes    Allergic rhinitis due to dust    Allergic rhinitis due to pollen    Seasonal rhinitis    Segmental and somatic dysfunction    Stress incontinence in female    Otalgia    Sensation of fullness in left ear    Tinnitus    Mass of vulva    Vitamin D deficiency    Osteopenia    Dietary counseling    Follicle cyst    Atypical squamous cell changes of undetermined significance (ASCUS) on cervical cytology with negative high risk human papilloma virus (HPV) test result    Chronic constipation    Presbyopia    Perimenopause    Keratoconjunctivitis sicca of both eyes not specified as Sjogren's    Calcaneal bursitis (heel)    Weight increasing     Nutrition History:  Food & Nutrition History: Life has been very busy since our last appointment which has detracted from her ability to focus on her diet. Also has upcoming L2C holidays in October that involve food with celebrations.     -- Oral Problems: chewing difficulty (due to braces);     Diet Recall:     -- Snacks: She remarked that the snack suggestions I emailed after our last visit in July were portions she would eat at a complete meal.     -- Food Variety: Present (no meat. Kosher.)     -- Energy Intake: Good > 75 % (although  "portions are small)    Anthropometrics:  Height: 157.5 cm (5' 2\"); Weight: 70.8 kg (156 lb) (per patient); BMI (Calculated): 28.53;  ;  ;      Weight History:   Daily Weight  09/06/24 : 70.8 kg (156 lb)  08/29/24 : 71.6 kg (157 lb 12.8 oz)  08/19/24 : 74.1 kg (163 lb 6.4 oz)  08/13/24 : 73 kg (161 lb)  07/19/24 : 73.9 kg (163 lb)  07/03/24 : 74.2 kg (163 lb 9.6 oz)  06/08/24 : 71.2 kg (157 lb)  05/14/24 : 74.3 kg (163 lb 12.8 oz)  03/05/24 : 73.3 kg (161 lb 9.6 oz)  12/13/23 : 73.5 kg (162 lb 1.6 oz)    Weight Change %:  Weight History / % Weight Change: weight has varied in the past 4 years - lowest 145# in 11/2021. Was 150# (11/2023) and then 162# (12/2023). She attributes some of the weight loss to chewing problems on account of orthodontics/braces discomfort. She desires to return weight to 150#    Nutrition Focused Physical Exam Findings:  Performed/Deferred: Deferred due to be being virtual visit      Nutrition Significant Labs:  RFP trend:   Recent Labs     08/14/24  1051 05/14/24  1035 03/05/24  1404 10/20/23  1325 02/21/23  1011 01/19/22  0840 01/07/22  1030   GLUCOSE 91 91 86   < > 92   < > 85    139 139   < > 138   < > 139   K 4.6 4.7 4.6   < > 5.5*   < > 4.2    105 103   < > 105   < > 103   CO2 29 27 28   < > 28   < > 30   ANIONGAP 11 12 13   < > 11   < > 10   BUN 14 9 17   < > 12   < > 11   CREATININE 0.88 0.88 0.74   < > 0.86   < > 0.84   EGFR 77 77 >90   < >  --   --   --    CALCIUM 10.5 10.2 10.6   < > 10.4   < > 10.7*   PHOS  --   --  3.8  --  3.4  --  3.3   ALBUMIN 4.3 4.9 4.5   < > 4.5   < > 4.6  4.6    < > = values in this interval not displayed.   , Lipid Panel trend:    Recent Labs     08/14/24  1051 10/20/23  1325 09/25/20  0950   CHOL 243* 241* 222*   HDL 65.1 79.1 79.2   LDLCALC 158* 151*  --    LDLF  --   --  131*   VLDL 20 11 12   TRIG 102 57 60   , Hgb A1c trend:   Recent Labs     08/14/24  1051   HGBA1C 5.6   , Vit D:   Lab Results   Component Value Date    VITD25 52 " "08/14/2024    , Iron Panel:   Lab Results   Component Value Date    IRON 100 10/20/2023    TIBC 381 10/20/2023    FERRITIN 20 02/21/2023    , Vit B12:   Lab Results   Component Value Date    MDYLLZRN18 412 08/14/2024    , and Folate: No results found for: \"FOLATE\"     Medications:    Current Outpatient Medications:     ALPRAZolam (Xanax) 0.5 mg tablet, Take 1 tablet (0.5 mg) by mouth 1 time for 1 dose., Disp: 1 tablet, Rfl: 0    atorvastatin (Lipitor) 10 mg tablet, Take 1 tablet (10 mg) by mouth once daily., Disp: 90 tablet, Rfl: 1    cetirizine (ZyrTEC) 10 mg tablet, Take 1 tablet (10 mg) by mouth once daily. as directed, Disp: , Rfl:     dexlansoprazole (Dexilant) 30 mg DR capsule, TAKE 1 CAPSULE (30 MG) BY MOUTH ONCE DAILY IN THE MORNING., Disp: 90 capsule, Rfl: 0    docusate sodium (STOOL SOFTENER ORAL), , Disp: , Rfl:     escitalopram (Lexapro) 20 mg tablet, Take 1 tablet (20 mg) by mouth once daily., Disp: 90 tablet, Rfl: 3    estradiol (Estrace) 0.01 % (0.1 mg/gram) vaginal cream, Insert 0.25 Applicatorfuls (1 g) into the vagina 2 times a week. At bedtime for 2 weeks, then at bedtime twice a week., Disp: 34 g, Rfl: 3    estradiol (Estrace) 0.01 % (0.1 mg/gram) vaginal cream, Apply 2 gm per vagina every night for two weeks. Then apply 1 gm twice a week for 8 weeks., Disp: 44 g, Rfl: 3    ferrous sulfate, 325 mg ferrous sulfate, (iron) tablet, Take 1 tablet (325 mg) by mouth once daily with breakfast., Disp: , Rfl:     fezolinetant 45 mg tablet, Take 45 mg by mouth once daily., Disp: 30 tablet, Rfl: 3    FLAXSEED OIL ORAL, , Disp: , Rfl:     fluticasone (Flonase) 50 mcg/actuation nasal spray, Administer 2 sprays into each nostril once daily., Disp: , Rfl:     fluticasone (Flonase) 50 mcg/actuation nasal spray, ADMINISTER 2 SPRAYS INTO EACH NOSTRIL ONCE DAILY. SHAKE GENTLY. BEFORE FIRST USE, PRIME PUMP. AFTER USE, CLEAN TIP AND REPLACE CAP., Disp: 48 mL, Rfl: 1    fluticasone (Flonase) 50 mcg/actuation nasal " spray, Administer 1 spray into each nostril once daily. Shake gently. Before first use, prime pump. After use, clean tip and replace cap., Disp: 16 g, Rfl: 0    magnesium oxide (Mag-Ox) 400 mg tablet, , Disp: , Rfl:     NON FORMULARY, VITAMIN D 1000 UNIT CAPS, Disp: , Rfl:     Estimated Needs:   Total Energy Estimated Needs (kCal): 1481.82 kCal; Method for Estimating Needs: 20 kcal/kg, which is similar to Mariel's calculation of 1422 at initial visit  Total Protein Estimated Needs (g): 74.09 g; Method for Estimating Needs: 1 gm/kg       Nutrition Diagnosis   Malnutrition Diagnosis  Patient has Malnutrition Diagnosis: No    Nutrition Diagnosis  Patient has Nutrition Diagnosis: Yes  Diagnosis Status (1): Ongoing  Nutrition Diagnosis 1: Altered nutrition related to laboratory values  Related to (1): endocrine and metabolic dysfunction  As Evidenced by (1): HgA1c of 5.7%, high cholesterol (241) and high LDL (151) on 10/20/2023.  Additional Assessment Information (1): Update: A1C is 5.6% (8/14/24) and . Atorvastatin was started  Additional Nutrition Diagnosis: Diagnosis 2  Diagnosis Status (2): Ongoing  Nutrition Diagnosis 2: Food and nutrition related knowledge deficit  Related to (2): lack of or limited prior nutrition-related education  As Evidenced by (2): she has questions about food/diet  Additional Assessment Information (2): Update: she continues to gain and apply new knowledge to facilitate weight loss       Nutrition Interventions/Recommendations   Nutrition Prescription: Individualized Nutrition Prescription Provided for : Reduced calorie diet    Nutrition Interventions:   Food and Nutrient Delivery: Meals & Snacks: Energy-modified diet, Modify schedule of foods/fluids  Goals: Consistent meal/snack pattern with adequate intake of protein and fiber     Nutrition Education:   Nutrition Education Content: Content related nutrition education    Nutrition Education Topics Discussed:   Answered her questions  "about  portions of food at meals and snacks.   Encouraged her not to perceive foods as \"bad\" such as white rice or a yam. Suggested portion control and combining those foods with plenty of vegetables and a protein-rich food.     Educational Handouts Provided: none    Nutrition Counseling: Strategies: Nutrition counseling based on motivational interviewing strategy    Readiness to Change : Good  Level of Understanding : Good  Anticipated Compliant : Good         Nutrition Monitoring and Evaluation   Food/Nutrient Related History Monitoring  Monitoring and Evaluation Plan: Meal/snack pattern  Meal/Snack Pattern: Estimated meal and snack pattern  Criteria: She is going to be attentive to hunger between meals and add snacks if hunger is present         Body Composition/Growth/Weight History  Monitoring and Evaluation Plan: Weight  Weight: Measured weight    Biochemical Data, Medical Tests and Procedures  Monitoring and Evaluation Plan: Lipid profile, Glucose/endocrine profile              Follow Up: 9/24 at 11:30 virtual    Time Spent  Prep time on day of patient encounter: 5 minutes  Time spent directly with patient, family or caregiver: 56 minutes  Additional Time Spent on Patient Care Activities: 0 minutes  Documentation Time: 5 minutes  Other Time Spent: 0 minutes  Total: 66 minutes      "

## 2024-09-06 NOTE — PATIENT INSTRUCTIONS
"Answered her questions about  portions of food at meals and snacks.   Encouraged her not to perceive foods as \"bad\" such as white rice or a yam. Suggested portion control and combining those foods with plenty of vegetables and a protein-rich food.   "

## 2024-09-07 DIAGNOSIS — J30.2 SEASONAL ALLERGIC RHINITIS, UNSPECIFIED TRIGGER: ICD-10-CM

## 2024-09-09 RX ORDER — FLUTICASONE PROPIONATE 50 MCG
1 SPRAY, SUSPENSION (ML) NASAL DAILY
Qty: 48 ML | Refills: 1 | Status: SHIPPED | OUTPATIENT
Start: 2024-09-09 | End: 2025-09-09

## 2024-09-16 ENCOUNTER — OFFICE VISIT (OUTPATIENT)
Dept: OBSTETRICS AND GYNECOLOGY | Facility: CLINIC | Age: 59
End: 2024-09-16
Payer: COMMERCIAL

## 2024-09-16 DIAGNOSIS — N39.41 URGE URINARY INCONTINENCE: Primary | ICD-10-CM

## 2024-09-16 DIAGNOSIS — Z78.0 MENOPAUSE: ICD-10-CM

## 2024-09-16 PROCEDURE — 99214 OFFICE O/P EST MOD 30 MIN: CPT | Performed by: NURSE PRACTITIONER

## 2024-09-16 PROCEDURE — 64566 NEUROELTRD STIM POST TIBIAL: CPT | Performed by: NURSE PRACTITIONER

## 2024-09-16 NOTE — PROGRESS NOTES
PERCUTANEOUS TIBIAL NERVE STIMULATION    Date/Time: 9/16/2024 9:04 AM    Performed by: DILIP Mejia  Authorized by: DILIP Mejia    Procedure Details     Indications: urge incontinence and urinary urgency      PTNS session #: 1    Ankle used: right      Stimulator intensity (mA): 13    Additional Details      Right ankle and foot cleaned with alcohol and grounding pad and needle placed without incident.      59 y.o. female presents for PTNS session #1. She denies having a pacemaker or any other electrical stimulation devices on her body.   She also inquires about the possibility of getting Veozah, which she has used previously with good results but was unable to continue due to audie cost.   She is currently using Estrace vaginal cream twice a week.         Assessment & Plan:  59 y.o. female with menopause and UUI. Comorbidities include: Prediabetes, Peptic ulcer, Chronic constipation, Hypercalcemia, Osteopenia, Anemia.    Diagnosis List:  #1 Menopause  #2 UUI    Plan:  1. Menopause  - Start Fezolinetant 45 mg   - Continue Estrace vaginal cream twice weekly.     2. UUI  - PTNS session #1, R ankle, level 13, for 30 minutes. Tolerated well without complications.     Follow up as scheduled with DILIP Mejia.         Scribe Attestation  By signing my name below, I, Ivonne Lopez, attest that this documentation has been prepared under the direction and in the presence of DILIP Mejia on 09/16/2024 at 10:30 AM.   33 min total spent

## 2024-09-23 ENCOUNTER — APPOINTMENT (OUTPATIENT)
Dept: OBSTETRICS AND GYNECOLOGY | Facility: CLINIC | Age: 59
End: 2024-09-23
Payer: COMMERCIAL

## 2024-09-24 ENCOUNTER — TELEMEDICINE CLINICAL SUPPORT (OUTPATIENT)
Dept: PRIMARY CARE | Facility: CLINIC | Age: 59
End: 2024-09-24
Payer: COMMERCIAL

## 2024-09-24 VITALS — BODY MASS INDEX: 28.52 KG/M2 | WEIGHT: 155 LBS | HEIGHT: 62 IN

## 2024-09-24 DIAGNOSIS — R73.03 PREDIABETES: ICD-10-CM

## 2024-09-24 DIAGNOSIS — Z71.3 DIETARY COUNSELING AND SURVEILLANCE: ICD-10-CM

## 2024-09-24 DIAGNOSIS — R63.5 WEIGHT INCREASING: Primary | ICD-10-CM

## 2024-09-24 NOTE — PATIENT INSTRUCTIONS
Discussed upcoming holidays and the food involved at the gatherings. Encouraged portion control as a strategy to navigate the holidays. Encouraged setting an intention before each gathering with a plan of what she might like to eat and how much she will indulge/avoid foods that she anticipates will be available.

## 2024-09-24 NOTE — PROGRESS NOTES
Nutrition Follow Up Assessment:     Bibiana Chatterjee is a 59 y.o. female being seen virtually who was referred by Safia Hernandez  on 7/2/24 for   1. Weight increasing        2. Dietary counseling and surveillance        3. Prediabetes          Nutrition Assessment    Patient Active Problem List   Diagnosis    Anxiety    Cervical pain    Chronic neck pain    Serum calcium elevated    Hypercalcemia    Hot flashes, menopausal    Hordeolum externum of left lower eyelid    Disorder of hand    Globus sensation    Generalized anxiety disorder    Ovarian cyst    Muscle spasm    Lesion of liver    Labial cyst    Knee pain    Iron deficiency anemia    Left ankle pain    Pain, foot, chronic, unspecified laterality    Peptic ulcer    Pollen-food allergy    Prediabetes    Allergic rhinitis due to dust    Allergic rhinitis due to pollen    Seasonal rhinitis    Segmental and somatic dysfunction    Stress incontinence in female    Otalgia    Sensation of fullness in left ear    Tinnitus    Mass of vulva    Vitamin D deficiency    Osteopenia    Dietary counseling and surveillance    Follicle cyst    Atypical squamous cell changes of undetermined significance (ASCUS) on cervical cytology with negative high risk human papilloma virus (HPV) test result    Chronic constipation    Presbyopia    Perimenopause    Keratoconjunctivitis sicca of both eyes not specified as Sjogren's    Calcaneal bursitis (heel)    Weight increasing     Nutrition History:  Food & Nutrition History: She reports she had been very consistent about healthy eating until a wedding this past weekend (hummus, eggplant, egg rolls, half a mini potato, and half of a slice of carrot cake, and half of a serving of layered chocolate mousse). Since we last spoke she bought barley but hasn't cooked it yet. She had hummus & estefany chips twice. She continues to be very cautious / limit portions with carbohydrate foods. She bought Rekoo noodles, liked them OK. [place] holidays  "are coming up in October - she'd like to discuss strategies to manage nutrition. She reflected on the challenges of having multiple holidays that are centered around food.     -- Food Allergies: none; Food Intolerances: none     -- GI Symptoms: constipation, increased gas, bloating; Occurring >2 weeks? n/a     -- Oral Problems: chewing difficulty (due to braces); Dentition: own     -- Sleep Habits: 7+ hrs continuous       -- Fluid Intake: mostly water, hot tea, 2 cups of coffee, no etoh     -- Energy Intake: Good > 75 %    Food Preparation:     -- Cooking: Patient, Spouse/Significant Other     -- Grocery Shopping: Patient, Spouse/Significant Other     -- Dining Out: 1 to 3 times a week    Anthropometrics:  Height: 157.5 cm (5' 2\"); Weight: 70.3 kg (155 lb) (at home); BMI (Calculated): 28.34;  ;  ;      Weight History:   Daily Weight  09/24/24 : 70.3 kg (155 lb)  09/06/24 : 70.8 kg (156 lb)  08/29/24 : 71.6 kg (157 lb 12.8 oz)  08/19/24 : 74.1 kg (163 lb 6.4 oz)  08/13/24 : 73 kg (161 lb)  07/19/24 : 73.9 kg (163 lb)  07/03/24 : 74.2 kg (163 lb 9.6 oz)  06/08/24 : 71.2 kg (157 lb)  05/14/24 : 74.3 kg (163 lb 12.8 oz)  03/05/24 : 73.3 kg (161 lb 9.6 oz)    Weight Change %:  Weight History / % Weight Change: weight has varied in the past 4 years - lowest 145# in 11/2021. Was 150# (11/2023) and then 162# (12/2023). She attributes some of the weight loss to chewing problems on account of orthodontics/braces discomfort. She desires to return weight to 150#    Nutrition Focused Physical Exam Findings:  Performed/Deferred: Deferred due to be being virtual visit    Nutrition Significant Labs:  RFP trend:   Recent Labs     08/14/24  1051 05/14/24  1035 03/05/24  1404 10/20/23  1325 02/21/23  1011 01/19/22  0840 01/07/22  1030   GLUCOSE 91 91 86   < > 92   < > 85    139 139   < > 138   < > 139   K 4.6 4.7 4.6   < > 5.5*   < > 4.2    105 103   < > 105   < > 103   CO2 29 27 28   < > 28   < > 30   ANIONGAP 11 12 13   " < > 11   < > 10   BUN 14 9 17   < > 12   < > 11   CREATININE 0.88 0.88 0.74   < > 0.86   < > 0.84   EGFR 77 77 >90   < >  --   --   --    CALCIUM 10.5 10.2 10.6   < > 10.4   < > 10.7*   PHOS  --   --  3.8  --  3.4  --  3.3   ALBUMIN 4.3 4.9 4.5   < > 4.5   < > 4.6  4.6    < > = values in this interval not displayed.   , Lipid Panel trend:    Recent Labs     08/14/24  1051 10/20/23  1325 09/25/20  0950   CHOL 243* 241* 222*   HDL 65.1 79.1 79.2   LDLCALC 158* 151*  --    LDLF  --   --  131*   VLDL 20 11 12   TRIG 102 57 60   , Hgb A1c trend:   Recent Labs     08/14/24  1051   HGBA1C 5.6   , Vit D:   Lab Results   Component Value Date    VITD25 52 08/14/2024    , Iron Panel:   Lab Results   Component Value Date    IRON 100 10/20/2023    TIBC 381 10/20/2023    FERRITIN 20 02/21/2023    , and Vit B12:   Lab Results   Component Value Date    AUYVJVIC63 412 08/14/2024      Medications:    Current Outpatient Medications:     ALPRAZolam (Xanax) 0.5 mg tablet, Take 1 tablet (0.5 mg) by mouth 1 time for 1 dose., Disp: 1 tablet, Rfl: 0    atorvastatin (Lipitor) 10 mg tablet, Take 1 tablet (10 mg) by mouth once daily., Disp: 90 tablet, Rfl: 1    cetirizine (ZyrTEC) 10 mg tablet, Take 1 tablet (10 mg) by mouth once daily. as directed, Disp: , Rfl:     dexlansoprazole (Dexilant) 30 mg DR capsule, TAKE 1 CAPSULE (30 MG) BY MOUTH ONCE DAILY IN THE MORNING., Disp: 90 capsule, Rfl: 0    docusate sodium (STOOL SOFTENER ORAL), , Disp: , Rfl:     escitalopram (Lexapro) 20 mg tablet, Take 1 tablet (20 mg) by mouth once daily., Disp: 90 tablet, Rfl: 3    estradiol (Estrace) 0.01 % (0.1 mg/gram) vaginal cream, Insert 0.25 Applicatorfuls (1 g) into the vagina 2 times a week. At bedtime for 2 weeks, then at bedtime twice a week., Disp: 34 g, Rfl: 3    estradiol (Estrace) 0.01 % (0.1 mg/gram) vaginal cream, Apply 2 gm per vagina every night for two weeks. Then apply 1 gm twice a week for 8 weeks., Disp: 44 g, Rfl: 3    ferrous sulfate, 325  mg ferrous sulfate, (iron) tablet, Take 1 tablet (325 mg) by mouth once daily with breakfast., Disp: , Rfl:     fezolinetant 45 mg tablet, Take 1 tablet (45 mg) by mouth once daily., Disp: 30 tablet, Rfl: 3    FLAXSEED OIL ORAL, , Disp: , Rfl:     fluticasone (Flonase) 50 mcg/actuation nasal spray, Administer 2 sprays into each nostril once daily., Disp: , Rfl:     fluticasone (Flonase) 50 mcg/actuation nasal spray, ADMINISTER 2 SPRAYS INTO EACH NOSTRIL ONCE DAILY. SHAKE GENTLY. BEFORE FIRST USE, PRIME PUMP. AFTER USE, CLEAN TIP AND REPLACE CAP., Disp: 48 mL, Rfl: 1    fluticasone (Flonase) 50 mcg/actuation nasal spray, ADMINISTER 1 SPRAY INTO EACH NOSTRIL ONCE DAILY. SHAKE GENTLY. BEFORE FIRST USE, PRIME PUMP. AFTER USE, CLEAN TIP AND REPLACE CAP., Disp: 48 mL, Rfl: 1    magnesium oxide (Mag-Ox) 400 mg tablet, , Disp: , Rfl:     NON FORMULARY, VITAMIN D 1000 UNIT CAPS, Disp: , Rfl:     Estimated Needs:   Total Energy Estimated Needs (kCal): 1481.82 kCal; Method for Estimating Needs: 20 kcal/kg, which is similar to Mariel's calculation of 1422 at initial visit  Total Protein Estimated Needs (g): 74.09 g; Method for Estimating Needs: 1 gm/kg       Nutrition Diagnosis   Malnutrition Diagnosis  Patient has Malnutrition Diagnosis: No    Nutrition Diagnosis  Patient has Nutrition Diagnosis: Yes  Diagnosis Status (1): Ongoing  Nutrition Diagnosis 1: Altered nutrition related to laboratory values  Related to (1): endocrine and metabolic dysfunction  As Evidenced by (1): HgA1c of 5.7%, high cholesterol (241) and high LDL (151) on 10/20/2023.  Additional Assessment Information (1): Update: noted new labs at our last visit. No new labs since then.  Additional Nutrition Diagnosis: Diagnosis 2  Diagnosis Status (2): Ongoing  Nutrition Diagnosis 2: Food and nutrition related knowledge deficit  Related to (2): lack of or limited prior nutrition-related education  As Evidenced by (2): she has questions about food/diet  Additional  Assessment Information (2): Update: she continues to gain and apply new knowledge to facilitate weight loss       Nutrition Interventions/Recommendations   Nutrition Prescription: Individualized Nutrition Prescription Provided for : Reduced calorie diet    Nutrition Interventions:   Food and Nutrient Delivery: Meals & Snacks: Energy-modified diet, Modify schedule of foods/fluids  Goals: Consistent meal/snack pattern with adequate intake of protein and fiber     Coordination of Care: Collaboration and referral of nutrition care:  (N/A)     Nutrition Education:   Nutrition Education Content: Content related nutrition education    Nutrition Education Topics Discussed:   Discussed upcoming holidays and the food involved at the gatherings. Encouraged portion control as a strategy to navigate the holidays. Encouraged setting an intention before each gathering with a plan of what she might like to eat and how much she will indulge/avoid foods that she anticipates will be available.     Educational Handouts Provided: N/A    Nutrition Counseling: Strategies: Nutrition counseling based on goal setting strategy    Readiness to Change : Good  Level of Understanding : Good  Anticipated Compliant : Good         Nutrition Monitoring and Evaluation   Food/Nutrient Related History Monitoring  Monitoring and Evaluation Plan: Amount of food  Amount of Food: Estimated amout of food  Criteria: She is going to focus on portion control at upcoming holiday gatherings         Body Composition/Growth/Weight History  Monitoring and Evaluation Plan: Weight  Weight: Measured weight    Biochemical Data, Medical Tests and Procedures  Monitoring and Evaluation Plan: Lipid profile, Glucose/endocrine profile              Follow Up: 11/22 at 1:30 pm virtual visit    Time Spent  Prep time on day of patient encounter: 5 minutes  Time spent directly with patient, family or caregiver: 33 minutes  Additional Time Spent on Patient Care Activities: 0  minutes  Documentation Time: 5 minutes  Other Time Spent: 0 minutes  Total: 43 minutes

## 2024-09-25 ENCOUNTER — OFFICE VISIT (OUTPATIENT)
Dept: OBSTETRICS AND GYNECOLOGY | Facility: CLINIC | Age: 59
End: 2024-09-25
Payer: COMMERCIAL

## 2024-09-25 DIAGNOSIS — K30 GASTROINTESTINAL DISCOMFORT: ICD-10-CM

## 2024-09-25 DIAGNOSIS — N39.41 URGE URINARY INCONTINENCE: Primary | ICD-10-CM

## 2024-09-25 DIAGNOSIS — K27.9 PEPTIC ULCER DISEASE: ICD-10-CM

## 2024-09-25 PROCEDURE — 64566 NEUROELTRD STIM POST TIBIAL: CPT | Performed by: NURSE PRACTITIONER

## 2024-09-25 PROCEDURE — 99214 OFFICE O/P EST MOD 30 MIN: CPT | Performed by: NURSE PRACTITIONER

## 2024-09-25 NOTE — PROGRESS NOTES
HPI:  59 year old female presenting for PTNS session #2 to help manage UUI.     Urinary Symptoms:  - She has not noticed any improvement in her OAB symptoms after initiating PTNS treatments yet.       PERCUTANEOUS TIBIAL NERVE STIMULATION    Date/Time: 9/25/2024 9:27 AM    Performed by: DILIP Mejia  Authorized by: DILIP Mejia    Procedure Details     Indications: urge incontinence      PTNS session #: 2    Bladder symptoms: unchanged      Ankle used: left      Stimulator intensity (mA): 8    Additional Details      Foot and ankle cleaned with alcohol.  Grounding pad and needle placed without incident.          Assessment and Plan:   59 year old female with KAILA with urge > stress presenting for PTNS session #2 today.     Diagnoses:  #1 Urge urinary incontinence     Plan:  1. UUI  - The patient completed PTNS session #2 in the left ankle today at level 8 for x30 minutes. She tolerated this procedure well. Of note, the patient has not noticed any improvement in her OAB symptoms yet since initiating PTNS treatments.     Follow up in 1 week with DILIP Mejia for PTNS session #3.     Scribe Attestation  By signing my name below, IKd Scribe, attest that this documentation has been prepared under the direction and in the presence of DILIP Mejia on 09/25/2024 at 2:18 PM.   I, DILIP Mejia, personally performed the services described in this documentation which was scribed virtually and I confirm that it is both accurate and complete.

## 2024-09-27 ENCOUNTER — HOSPITAL ENCOUNTER (OUTPATIENT)
Dept: RADIOLOGY | Facility: HOSPITAL | Age: 59
Discharge: HOME | End: 2024-09-27
Payer: COMMERCIAL

## 2024-09-27 DIAGNOSIS — R13.10 DYSPHAGIA, UNSPECIFIED TYPE: ICD-10-CM

## 2024-09-27 PROCEDURE — 2500000005 HC RX 250 GENERAL PHARMACY W/O HCPCS

## 2024-09-27 PROCEDURE — 92611 MOTION FLUOROSCOPY/SWALLOW: CPT | Mod: GN | Performed by: SPEECH-LANGUAGE PATHOLOGIST

## 2024-09-27 PROCEDURE — 74230 X-RAY XM SWLNG FUNCJ C+: CPT

## 2024-09-27 RX ORDER — DEXLANSOPRAZOLE 30 MG/1
30 CAPSULE, DELAYED RELEASE ORAL EVERY MORNING
Qty: 90 CAPSULE | Refills: 0 | Status: SHIPPED | OUTPATIENT
Start: 2024-09-27

## 2024-09-27 NOTE — PROCEDURES
Speech-Language Pathology  Adult Inpatient Modified Barium Swallow Study (MBSS)    Patient Name: Bibiana Chatterjee  MRN: [id number]  Today's Date: 9/27/2024       Initial MBSS: Yes    Respiratory Status:  WFL    History of Present Illness: Pt reporting globus sensation and difficulty with swallowing.      Impression:   Modified Barium Swallow Study completed. Verbal consent obtained.   ORAL PHASE:  Adequate labial seal and oral containment.  Functional mastication and bolus formation with AP transfer.  Adequate lingual hold with no premature spillage.  No lingual stasis noted.  PHARYNGEAL PHASE:  Adequate hyolaryngeal elevation/excursion.  Timely and complete epiglottic inversion and laryngeal vestibule closure.  NO penetration/aspiration noted across all trials and consistencies. No pharyngeal residuals.   ESOPHAGEAL PHASE:  upper esophagus appears more narrow.  Ap sweep completed.     Rosenbeck:  Thin: 1 - Material does not enter airway.   Nectar: 1 - Material does not enter airway.   Honey: 1 - Material does not enter airway.   Puree: 1 - Material does not enter airway.   Solids: 1 - Material does not enter airway.     Recommendations:  Regular diet/thin liquids  Upright for all PO intake  Small bites/sips  Slow rate of consumption  Pills per pt preference    Goal:   Pt will tolerate least restrictive diet with no overt clinical s/s aspiration 100% of the time.   Start Date: 9/26/24         Plan:  SLP Services Indicated: No  Frequency: Eval only  Discussed POC with patient  SLP - OK to Discharge    Pain:   0-10  0 = No pain.     Inpatient Education:  Extensive education provided to patient regarding current swallow function, recommendations/results, and POC.      Consultations/Referrals/Coordination of Services:   N/A

## 2024-09-30 ENCOUNTER — OFFICE VISIT (OUTPATIENT)
Dept: OBSTETRICS AND GYNECOLOGY | Facility: CLINIC | Age: 59
End: 2024-09-30
Payer: COMMERCIAL

## 2024-09-30 DIAGNOSIS — N39.41 URGE URINARY INCONTINENCE: Primary | ICD-10-CM

## 2024-09-30 PROCEDURE — 99214 OFFICE O/P EST MOD 30 MIN: CPT | Mod: 25 | Performed by: NURSE PRACTITIONER

## 2024-09-30 PROCEDURE — 64566 NEUROELTRD STIM POST TIBIAL: CPT | Performed by: NURSE PRACTITIONER

## 2024-09-30 PROCEDURE — 99214 OFFICE O/P EST MOD 30 MIN: CPT | Performed by: NURSE PRACTITIONER

## 2024-09-30 NOTE — PROGRESS NOTES
PERCUTANEOUS TIBIAL NERVE STIMULATION    Date/Time: 9/30/2024 2:11 PM    Performed by: DILIP Mejia  Authorized by: DILIP Mejia    Procedure Details     Indications: urge incontinence      PTNS session #: 3    Bladder symptoms: unchanged      Ankle used: right      Stimulator intensity (mA): 8    Additional Details      Right foot and ankle cleaned with alcohol and grounding pad and needle placed without incident       Assessment and Plan  59 y.o. female with UUI presenting for PTNS session #3.    Diagnosis List:  #1 UUI    Plan:  1. UUI   - PTNS session #3, R ankle, level 8 for 30 minutes. Tolerated well without complications.     Follow up in 1 week with DILIP Mejia.      Scribe Attestation  By signing my name below, IErlinda Scribe, attest that this documentation has been prepared under the direction and in the presence of DILIP Mejia on 09/30/2024 at 6:15 PM.   I, DILIP Mejia, personally performed the services described in this documentation which was scribed virtually and I confirm that it is both accurate and complete.

## 2024-10-08 ENCOUNTER — OFFICE VISIT (OUTPATIENT)
Dept: PRIMARY CARE | Facility: CLINIC | Age: 59
End: 2024-10-08
Payer: COMMERCIAL

## 2024-10-08 VITALS
RESPIRATION RATE: 21 BRPM | HEART RATE: 73 BPM | HEIGHT: 62 IN | DIASTOLIC BLOOD PRESSURE: 68 MMHG | WEIGHT: 158 LBS | OXYGEN SATURATION: 97 % | BODY MASS INDEX: 29.08 KG/M2 | SYSTOLIC BLOOD PRESSURE: 97 MMHG

## 2024-10-08 DIAGNOSIS — G47.00 INSOMNIA, UNSPECIFIED TYPE: ICD-10-CM

## 2024-10-08 DIAGNOSIS — K27.9 PEPTIC ULCER DISEASE: ICD-10-CM

## 2024-10-08 DIAGNOSIS — K30 GASTROINTESTINAL DISCOMFORT: ICD-10-CM

## 2024-10-08 DIAGNOSIS — H10.13 ALLERGIC CONJUNCTIVITIS OF BOTH EYES: Primary | ICD-10-CM

## 2024-10-08 DIAGNOSIS — J30.2 SEASONAL ALLERGIES: ICD-10-CM

## 2024-10-08 PROCEDURE — 3008F BODY MASS INDEX DOCD: CPT

## 2024-10-08 PROCEDURE — 99213 OFFICE O/P EST LOW 20 MIN: CPT

## 2024-10-08 PROCEDURE — 1036F TOBACCO NON-USER: CPT

## 2024-10-08 RX ORDER — KETOTIFEN FUMARATE 0.35 MG/ML
1 SOLUTION/ DROPS OPHTHALMIC 2 TIMES DAILY
Qty: 5 ML | Refills: 0 | Status: SHIPPED | OUTPATIENT
Start: 2024-10-08

## 2024-10-08 RX ORDER — CETIRIZINE HYDROCHLORIDE 10 MG/1
10 TABLET ORAL DAILY
Qty: 90 TABLET | Refills: 0 | Status: SHIPPED | OUTPATIENT
Start: 2024-10-08

## 2024-10-08 RX ORDER — DEXLANSOPRAZOLE 30 MG/1
30 CAPSULE, DELAYED RELEASE ORAL EVERY MORNING
Qty: 90 CAPSULE | Refills: 3 | Status: SHIPPED | OUTPATIENT
Start: 2024-10-08

## 2024-10-08 ASSESSMENT — PATIENT HEALTH QUESTIONNAIRE - PHQ9
2. FEELING DOWN, DEPRESSED OR HOPELESS: NOT AT ALL
SUM OF ALL RESPONSES TO PHQ9 QUESTIONS 1 AND 2: 0
1. LITTLE INTEREST OR PLEASURE IN DOING THINGS: NOT AT ALL

## 2024-10-08 ASSESSMENT — ENCOUNTER SYMPTOMS
NECK PAIN: 0
SEIZURES: 0
TROUBLE SWALLOWING: 0
MYALGIAS: 0
COUGH: 0
EYE REDNESS: 1
FLANK PAIN: 0
CARDIOVASCULAR NEGATIVE: 1
POLYDIPSIA: 0
NECK STIFFNESS: 0
DIARRHEA: 0
HEMATOLOGIC/LYMPHATIC NEGATIVE: 1
VOICE CHANGE: 0
NERVOUS/ANXIOUS: 0
CHEST TIGHTNESS: 0
FEVER: 0
RESPIRATORY NEGATIVE: 1
BACK PAIN: 0
OCCASIONAL FEELINGS OF UNSTEADINESS: 0
SLEEP DISTURBANCE: 1
DIFFICULTY URINATING: 0
DIZZINESS: 0
APPETITE CHANGE: 0
PALPITATIONS: 0
PHOTOPHOBIA: 0
ABDOMINAL PAIN: 0
ENDOCRINE NEGATIVE: 1
RHINORRHEA: 0
AGITATION: 0
FREQUENCY: 0
LOSS OF SENSATION IN FEET: 0
CONFUSION: 0
HEADACHES: 0
BLOOD IN STOOL: 0
SPEECH DIFFICULTY: 0
ANAL BLEEDING: 0
UNEXPECTED WEIGHT CHANGE: 0
SINUS PRESSURE: 0
STRIDOR: 0
DYSPHORIC MOOD: 0
ACTIVITY CHANGE: 0
NAUSEA: 0
BRUISES/BLEEDS EASILY: 0
WHEEZING: 0
EYE PAIN: 0
DIAPHORESIS: 0
NUMBNESS: 0
SHORTNESS OF BREATH: 0
CHILLS: 0
HYPERACTIVE: 0
APNEA: 0
JOINT SWELLING: 0
SORE THROAT: 0
DYSURIA: 0
FATIGUE: 0
HEMATURIA: 0
NEUROLOGICAL NEGATIVE: 1
CONSTIPATION: 0
RECTAL PAIN: 0
TREMORS: 0
LIGHT-HEADEDNESS: 0
POLYPHAGIA: 0
ABDOMINAL DISTENTION: 0
DEPRESSION: 0
COLOR CHANGE: 0
MUSCULOSKELETAL NEGATIVE: 1
EYE ITCHING: 1
GASTROINTESTINAL NEGATIVE: 1
CONSTITUTIONAL NEGATIVE: 1
WEAKNESS: 0

## 2024-10-08 NOTE — PROGRESS NOTES
Primary Care Provider: FLORENCIO Esposito-CNP    Subjective   Bibiana AMY Chatterjee is a 59 y.o. female who presents for Follow-up (Eye infection/Both itchy /Said right eye is red/Since Friday /).    HPI     Sick visit    Here today with concerns of eye infection    Right eye 4 days ago started to itch and redness  Reports some discharge one day only in the morning- nothing since   Uses refresh eye drops  No pain  No visions changes  Has seasonal allergies  Denies any injury or trauma to her eye  BL itching- right eye worse    GERD- Dexilant refilled for 1 yr supply- per patient she had issues getting her medication and she is upset about this    Trouble with sleep- taking OTC Doxylamine  Interested in trazodone but she has concerns of side effects     Review of Systems   Constitutional: Negative.  Negative for activity change, appetite change, chills, diaphoresis, fatigue, fever and unexpected weight change.   HENT: Negative.  Negative for congestion, dental problem, ear discharge, ear pain, hearing loss, mouth sores, nosebleeds, postnasal drip, rhinorrhea, sinus pressure, sneezing, sore throat, tinnitus, trouble swallowing and voice change.    Eyes:  Positive for redness and itching. Negative for photophobia, pain and visual disturbance.   Respiratory: Negative.  Negative for apnea, cough, chest tightness, shortness of breath, wheezing and stridor.    Cardiovascular: Negative.  Negative for chest pain, palpitations and leg swelling.   Gastrointestinal: Negative.  Negative for abdominal distention, abdominal pain, anal bleeding, blood in stool, constipation, diarrhea, nausea and rectal pain.   Endocrine: Negative.  Negative for cold intolerance, heat intolerance, polydipsia, polyphagia and polyuria.   Genitourinary: Negative.  Negative for decreased urine volume, difficulty urinating, dysuria, flank pain, frequency, hematuria and urgency.   Musculoskeletal: Negative.  Negative for back pain, gait problem, joint  "swelling, myalgias, neck pain and neck stiffness.   Skin: Negative.  Negative for color change and rash.   Neurological: Negative.  Negative for dizziness, tremors, seizures, syncope, speech difficulty, weakness, light-headedness, numbness and headaches.   Hematological: Negative.  Does not bruise/bleed easily.   Psychiatric/Behavioral:  Positive for sleep disturbance. Negative for agitation, confusion, dysphoric mood and suicidal ideas. The patient is not nervous/anxious and is not hyperactive.    All other systems reviewed and are negative.        Objective   BP 97/68   Pulse 73   Resp 21   Ht 1.575 m (5' 2\")   Wt 71.7 kg (158 lb)   LMP  (LMP Unknown)   SpO2 97%   BMI 28.90 kg/m²     Physical Exam  Vitals reviewed.   Constitutional:       General: She is not in acute distress.     Appearance: Normal appearance. She is normal weight. She is not ill-appearing, toxic-appearing or diaphoretic.   HENT:      Head: Normocephalic and atraumatic.      Nose: Nose normal.   Eyes:      General: Lids are normal.         Right eye: No foreign body or discharge.         Left eye: No foreign body or discharge.      Extraocular Movements: Extraocular movements intact.      Pupils: Pupils are equal, round, and reactive to light.      Comments: Conjunctivae with erythema BL   Cardiovascular:      Rate and Rhythm: Normal rate and regular rhythm.      Pulses: Normal pulses.      Heart sounds: Normal heart sounds. No murmur heard.     No friction rub. No gallop.   Pulmonary:      Effort: Pulmonary effort is normal. No respiratory distress.      Breath sounds: Normal breath sounds.   Musculoskeletal:      Cervical back: Normal range of motion and neck supple.   Skin:     General: Skin is warm and dry.      Capillary Refill: Capillary refill takes less than 2 seconds.   Neurological:      General: No focal deficit present.      Mental Status: She is alert and oriented to person, place, and time. Mental status is at baseline. "   Psychiatric:         Thought Content: Thought content normal.         Judgment: Judgment normal.         Assessment/Plan   Problem List Items Addressed This Visit    Sick visit           ICD-10-CM    Insomnia  Stable on OTC Doxylamine- okay to use as needed  Work on good sleep hygiene practices  G47.00    Seasonal allergies J30.2    Relevant Medications    cetirizine (ZyrTEC) 10 mg tablet    Allergic conjunctivitis of both eyes - Primary H10.13    Relevant Medications    ketotifen (Zaditor) 0.025 % (0.035 %) ophthalmic solution     Other Visit Diagnoses         Codes    Peptic ulcer disease     K27.9    Relevant Medications    C/w dexlansoprazole (Dexilant) 30 mg DR capsule    Gastrointestinal discomfort     K30    Relevant Medications    C/w dexlansoprazole (Dexilant) 30 mg DR capsule        Follow up if symptoms worsen or persist

## 2024-10-09 ENCOUNTER — OFFICE VISIT (OUTPATIENT)
Dept: OBSTETRICS AND GYNECOLOGY | Facility: CLINIC | Age: 59
End: 2024-10-09
Payer: COMMERCIAL

## 2024-10-09 DIAGNOSIS — N32.81 OVERACTIVE BLADDER: Primary | ICD-10-CM

## 2024-10-09 PROCEDURE — 99214 OFFICE O/P EST MOD 30 MIN: CPT | Mod: 25 | Performed by: NURSE PRACTITIONER

## 2024-10-09 PROCEDURE — 64566 NEUROELTRD STIM POST TIBIAL: CPT | Performed by: NURSE PRACTITIONER

## 2024-10-09 PROCEDURE — 99214 OFFICE O/P EST MOD 30 MIN: CPT | Performed by: NURSE PRACTITIONER

## 2024-10-09 NOTE — PROGRESS NOTES
59 y.o. female with OAB presenting for PTNS session #4. Reports urinary symptoms have improved and has noticed a significant decrease in urinary frequency since starting the treatment. She was able to manage errands without the usual urgency and is overall satisfied with results thus far.          PERCUTANEOUS TIBIAL NERVE STIMULATION    Date/Time: 10/9/2024 9:31 AM    Performed by: DILIP Mejia  Authorized by: DILIP Mejia    Procedure Details     Indications: urinary urgency      PTNS session #: 4    Bladder symptoms: improved      Ankle used: right      Stimulator intensity (mA): 6        Assessment & Plan  59 y.o. female with OAB presenting for PTNS session #4. Comorbidities include: Prediabetes, Chronic constipation, Peptic ulcer, Anemia, Osteopenia.     Diagnosis List:  #1 OAB    Plan:  1. OAB  - PTNS session #4 performed on R ankle, level 6 for 30 minutes. Tolerated well without complications.       Follow up in 1 week with DILIP Mejia.    Scribe Attestation  By signing my name below, IErlinda Scribe, attest that this documentation has been prepared under the direction and in the presence of DILIP Mejia on 10/09/2024 at 12:11 PM.   I, DILPI Mejia, personally performed the services described in this documentation which was scribed virtually and I confirm that it is both accurate and complete.

## 2024-10-15 ENCOUNTER — OFFICE VISIT (OUTPATIENT)
Dept: OBSTETRICS AND GYNECOLOGY | Facility: CLINIC | Age: 59
End: 2024-10-15
Payer: COMMERCIAL

## 2024-10-15 DIAGNOSIS — N32.81 OVERACTIVE BLADDER: Primary | ICD-10-CM

## 2024-10-15 PROCEDURE — 99214 OFFICE O/P EST MOD 30 MIN: CPT | Performed by: NURSE PRACTITIONER

## 2024-10-15 PROCEDURE — 64566 NEUROELTRD STIM POST TIBIAL: CPT | Performed by: NURSE PRACTITIONER

## 2024-10-15 NOTE — PROGRESS NOTES
HPI  59 y.o. female with OAB presenting for PTNS session #5.     Urinary Symptoms:   - Reports having increased urgency over the past week.     Interval History:  - Recently celebrated [employer]. She is modern [employer].     Procedure:   PERCUTANEOUS TIBIAL NERVE STIMULATION    Date/Time: 10/15/2024 2:45 PM    Performed by: DILIP Mejia  Authorized by: DILIP Mejia    Procedure Details     Indications: urinary urgency      PTNS session #: 5    Bladder symptoms: worsened      Ankle used: right      Stimulator intensity (mA): 7      59 y.o. female with KAILA with urge > stress presenting for PTNS session #5 today.     Diagnoses:   #1 OAB    Plan:   1. OAB  -  The patient completed PTNS session #5 in the right ankle today at level 7 x30 minutes. She tolerated this procedure well.      Follow-up in 1 week with DILIP Mejia for PTNS session #6.     Scribe Attestation:   IChristy, am scribing for virtually, and in the presence of DILIP Mejia on 10/15/2024 at 3:37 PM.   I, DILIP Mejia, personally performed the services described in this documentation which was scribed virtually and I confirm that it is both accurate and complete.

## 2024-10-23 ENCOUNTER — APPOINTMENT (OUTPATIENT)
Dept: OBSTETRICS AND GYNECOLOGY | Facility: CLINIC | Age: 59
End: 2024-10-23
Payer: COMMERCIAL

## 2024-10-24 ENCOUNTER — OFFICE VISIT (OUTPATIENT)
Dept: OBSTETRICS AND GYNECOLOGY | Facility: CLINIC | Age: 59
End: 2024-10-24
Payer: COMMERCIAL

## 2024-10-24 VITALS — DIASTOLIC BLOOD PRESSURE: 68 MMHG | SYSTOLIC BLOOD PRESSURE: 100 MMHG | HEART RATE: 80 BPM

## 2024-10-24 DIAGNOSIS — N32.81 OVERACTIVE BLADDER: Primary | ICD-10-CM

## 2024-10-24 PROCEDURE — 64566 NEUROELTRD STIM POST TIBIAL: CPT | Performed by: NURSE PRACTITIONER

## 2024-10-24 PROCEDURE — 99214 OFFICE O/P EST MOD 30 MIN: CPT | Performed by: NURSE PRACTITIONER

## 2024-10-24 PROCEDURE — 1036F TOBACCO NON-USER: CPT | Performed by: NURSE PRACTITIONER

## 2024-10-24 PROCEDURE — 99214 OFFICE O/P EST MOD 30 MIN: CPT | Mod: 25 | Performed by: NURSE PRACTITIONER

## 2024-10-24 ASSESSMENT — PAIN SCALES - GENERAL: PAINLEVEL_OUTOF10: 0-NO PAIN

## 2024-10-27 DIAGNOSIS — H10.13 ALLERGIC CONJUNCTIVITIS OF BOTH EYES: ICD-10-CM

## 2024-10-28 RX ORDER — KETOTIFEN FUMARATE 0.35 MG/ML
1 SOLUTION/ DROPS OPHTHALMIC 2 TIMES DAILY
Qty: 5 ML | Refills: 0 | Status: SHIPPED | OUTPATIENT
Start: 2024-10-28

## 2024-10-29 ENCOUNTER — APPOINTMENT (OUTPATIENT)
Dept: OPHTHALMOLOGY | Facility: CLINIC | Age: 59
End: 2024-10-29
Payer: COMMERCIAL

## 2024-10-29 SDOH — SOCIAL STABILITY: SOCIAL NETWORK: I HAVE TROUBLE DOING ALL OF THE FAMILY ACTIVITIES THAT I WANT TO DO: RARELY

## 2024-10-29 SDOH — ECONOMIC STABILITY: FOOD INSECURITY: WITHIN THE PAST 12 MONTHS, THE FOOD YOU BOUGHT JUST DIDN'T LAST AND YOU DIDN'T HAVE MONEY TO GET MORE.: NEVER TRUE

## 2024-10-29 SDOH — SOCIAL STABILITY: SOCIAL NETWORK: PROMIS ABILITY TO PARTICIPATE IN SOCIAL ROLES & ACTIVITIES T-SCORE: 54

## 2024-10-29 SDOH — SOCIAL STABILITY: SOCIAL NETWORK: I HAVE TROUBLE DOING ALL OF MY USUAL WORK (INCLUDE WORK AT HOME): SOMETIMES

## 2024-10-29 SDOH — SOCIAL STABILITY: SOCIAL NETWORK

## 2024-10-29 SDOH — ECONOMIC STABILITY: FOOD INSECURITY: WITHIN THE PAST 12 MONTHS, YOU WORRIED THAT YOUR FOOD WOULD RUN OUT BEFORE YOU GOT MONEY TO BUY MORE.: NEVER TRUE

## 2024-10-29 SDOH — SOCIAL STABILITY: SOCIAL NETWORK: I HAVE TROUBLE DOING ALL OF THE ACTIVITIES WITH FRIENDS THAT I WANT TO DO: NEVER

## 2024-10-29 SDOH — SOCIAL STABILITY: SOCIAL NETWORK: I HAVE TROUBLE DOING ALL OF MY REGULAR LEISURE ACTIVITIES WITH OTHERS: NEVER

## 2024-10-29 ASSESSMENT — PROMIS GLOBAL HEALTH SCALE
RATE_MENTAL_HEALTH: VERY GOOD
CARRYOUT_PHYSICAL_ACTIVITIES: MOSTLY
CARRYOUT_SOCIAL_ACTIVITIES: EXCELLENT
RATE_SOCIAL_SATISFACTION: VERY GOOD
EMOTIONAL_PROBLEMS: RARELY
RATE_AVERAGE_FATIGUE: MILD
RATE_AVERAGE_PAIN: 3
RATE_PHYSICAL_HEALTH: VERY GOOD
RATE_GENERAL_HEALTH: VERY GOOD
RATE_QUALITY_OF_LIFE: VERY GOOD

## 2024-10-29 ASSESSMENT — ANXIETY QUESTIONNAIRES
PAST MONTH HOW OFTEN HAVE YOU FELT THAT THINGS WERE GOING YOUR WAY: 3 - FAIRLY OFTEN
PAST MONTH HOW OFTEN HAVE YOU FELT CONFIDENT ABOUT YOUR ABILITY TO HANDLE YOUR PROBLEMS: 4 - VERY OFTEN
PAST MONTH HOW OFTEN HAVE YOU FELT THAT YOU WERE UNABLE TO CONTROL THE IMPORTANT THINGS IN YOUR LIFE: 1 - ALMOST NEVER
PAST MONTH HOW OFTEN HAVE YOU FELT DIFFICULTIES WERE PILING UP SO HIGH THAT YOU COULD NOT OVERCOME THEM: 1 - ALMOST NEVER
PAST MONTH HOW OFTEN HAVE YOU FELT CONFIDENT ABOUT YOUR ABILITY TO HANDLE YOUR PROBLEMS: 4 - VERY OFTEN
PAST MONTH HOW OFTEN HAVE YOU FELT THAT YOU WERE UNABLE TO CONTROL THE IMPORTANT THINGS IN YOUR LIFE: 1 - ALMOST NEVER

## 2024-10-30 ENCOUNTER — APPOINTMENT (OUTPATIENT)
Dept: INTEGRATIVE MEDICINE | Facility: CLINIC | Age: 59
End: 2024-10-30
Payer: COMMERCIAL

## 2024-10-30 DIAGNOSIS — M54.59 MECHANICAL LOW BACK PAIN: ICD-10-CM

## 2024-10-30 DIAGNOSIS — M79.10 MYALGIA: ICD-10-CM

## 2024-10-30 DIAGNOSIS — M99.04 SEGMENTAL AND SOMATIC DYSFUNCTION OF SACRAL REGION: ICD-10-CM

## 2024-10-30 DIAGNOSIS — M79.9 POSTURAL STRAIN: ICD-10-CM

## 2024-10-30 DIAGNOSIS — M99.02 SEGMENTAL AND SOMATIC DYSFUNCTION OF THORACIC REGION: ICD-10-CM

## 2024-10-30 DIAGNOSIS — M99.01 SEGMENTAL AND SOMATIC DYSFUNCTION OF CERVICAL REGION: ICD-10-CM

## 2024-10-30 DIAGNOSIS — M89.8X1 PERISCAPULAR PAIN: ICD-10-CM

## 2024-10-30 DIAGNOSIS — M99.03 SEGMENTAL AND SOMATIC DYSFUNCTION OF LUMBAR REGION: Primary | ICD-10-CM

## 2024-10-30 PROCEDURE — 99203 OFFICE O/P NEW LOW 30 MIN: CPT | Performed by: CHIROPRACTOR

## 2024-10-30 PROCEDURE — 98941 CHIROPRACT MANJ 3-4 REGIONS: CPT | Performed by: CHIROPRACTOR

## 2024-10-30 NOTE — PROGRESS NOTES
Subjective   Patient ID: Bibiana Chatterjee is a 59 y.o. female who presents today, October 30, 2024 for a new patient evaluation and treatment of neck pain, left sided shoulder blade pain, and low back pain.    (1/24) [facility]    Chiropractic Medicine HPI:  Provacative factors include : standing, sitting  Palliative factors incude : heat, topicals, massage  Pain is described as : ache, stiff, deep   Previous treatment for complaint has included : heat, Physical Therapy, Massage Therapy  Patient denies : trauma/accidents/injuries/falls (last 6 months), numbness/tingling, bowel weakness, difficulty walking, instability, catching, clicking, grinding, popping  Patient rates severity of pain at : 3/10 on a numerical pain scale  Completed Oswestry Disability Index prior to visit: yes     Initial exam:   Bibiana Chatterjee presents for evaluation and treatment of chronic neck, upper back, and low back pain. She states that her low back pain occurs intermittently. She reports that the most recent episode started a couple of days. She states that upon waking, she noticed her back symptoms when she was reaching for something. She states that her symptoms started to dissipate throughout the day. She reports that her neck and upper back pain occurs predominately around the left shoulder blade. She states that her symptoms are daily but have been present for decades. She states that her headaches occur about once per month. She states that she has tried physical therapy and massage but finds that the results are temporary at best. She states that increased stress will aggravate her shoulder pain.       Objective   Review of Systems   Constitutional: Negative.    Eyes: Negative.    Respiratory: Negative.     Cardiovascular: Negative.    Gastrointestinal: Negative.    Genitourinary: Negative.    Musculoskeletal:  Positive for back pain, myalgias and neck stiffness.   Neurological:  Positive for headaches.   Hematological: Negative.     Psychiatric/Behavioral: Negative.     All other systems reviewed and are negative.    Physical Exam  Musculoskeletal:        Arms:         Back:         Legs:    Neurological:      General: No focal deficit present.      Mental Status: She is alert and oriented to person, place, and time.      Cranial Nerves: No dysarthria or facial asymmetry.      Sensory: Sensation is intact.      Motor: Motor function is intact.      Coordination: Coordination is intact.      Gait: Gait is intact.        Spine Musculoskeletal Exam    Gait    Gait is normal.    Inspection    Leg length disparity: left greater than right    Sagittal balance: anterior imbalance    Palpation    Cervical Spine    Tenderness: present      Paraspinous: right and left      Trapezius: right and left      Periscapular: left      Spinous process: lower cervical    Right      Masses: none      Spasms: none      Crepitus: none      Muscle tone: normal    Left      Masses: none      Spasms: none      Crepitus: none      Muscle tone: increased    Thoracolumbar    Tenderness: present      Paraspinous: right and left      SI Joint: right and left    Right      Masses: none      Spasms: none      Crepitus: none      Muscle tone: normal    Left      Masses: none      Spasms: none      Crepitus: none      Muscle tone: normal    Range of Motion    Cervical Spine       Cervical flexion: 1-2 finger breadths. Cervical flexion detail: no pain.     Cervical extension: normal. Cervical extension detail: no pain.       Right      Lateral bending: normal. Lateral bending detail: no pain.       Lateral rotation: normal. Lateral rotation detail: no pain.       Left      Lateral bending: normal. Lateral bending detail: no pain.       Lateral rotation: normal. Lateral rotation detail: no pain.      Thoracolumbar       Flexion: normal. Flexion detail: no pain.     Extension: normal. Extension detail: no pain.       Right      Lateral bending: normal. Lateral bending detail: no  pain.       Lateral rotation: normal. Lateral rotation detail: no pain.       Left      Lateral bendin%. Lateral bending detail: no pain and guarding.       Lateral rotation: 75%. Lateral rotation detail: no pain and guarding.      Strength    Cervical Spine    Cervical spine motor exam is normal.    Thoracolumbar    Thoracolumbar motor exam is normal.       General    Neurological: alert    Orthopedic Tests:  Cervical: Neutral compression Negative.  Maximum compression Bilateral and Negative.  Cervical distraction Negative.  VBI Test Bilateral and Negative.  Thoracic/Lumbar/Sacrum: Lumbosacral Chen's  Bilateral and Negative.  Slump Test Bilateral and Negative.  Yeoman's Bilateral and Negative.    Segmental Joint(s): Segmental joint dysfunction was assessed with motion palpation and is identified in the following areas:  Cervical : C5 C6  Thoracic : T3, T5, and T8  Lumbopelvic / Sacral SIJ : L5/S1 and R SIJ    Assessment/Plan   Today's Treatment Included: Spinal manipulation to the following regions of segmental dysfunction identified on examination, using age-appropriate and injury specific force. Segmental Joint(s) Cervical : C2 C6 Segmental Joint(s) Thoracic : T4, T5, and T7 Segmental Joint(s) Lumbopelvic/Sacral SIJ : L4, L5/S1, and R SIJ  Chiropractic manipulation treatment techniques utilized: Diversified CMT, Pelvic drop table technique, Pelvic blocking technique, and Cervical Manual Traction  Soft tissue mobilization techniques utilized during treatment: Pin and Stretch, Ischemic compression, and Manual Dynamic Stretching    Soft-tissue mobilization was performed in the following areas:   Cervical paraspinal mm. bilateral, Scalenes left, Upper Trapezius left, and Levator Scap. left  Thoracic Paraspinal mm. left, Middle Trapezius left, Rhomboids left, Pectoralis left, Subscapularis left, and Latissimus Dorsi left  Lumbar Paraspinal mm. bilateral, Quadratus Lumborum bilateral, and Gluteal mm. Glute. Max.   and Glute. Med. bilateral    Treatment Plan:   The patient and I discussed the risks and benefits of Chiropractic Care. Consent for care was given both written and orally by the patient.  Based on the patient's subjective complaints along with the examination findings, it is advised that a course of Chiropractic Treatment by initiated in the form of:   Chiropractic Manipulation (CMT)  Neuro-Muscular Re-education  Integrative Dry Needing (IDN)  Chiropractic treatment recommended at a frequency of 1 times per week for 4 weeks in conjunction with other providers and treatment modalities.  The goals of the treatment will be to: decrease pain, increase activity, increase range of motion, reduce lower back pain, reduce neck pain, improve quality of life, reduce leg pain, decrease muscular hypertonicity, increase functional capacity, and improve postural strength  The patient tolerated today's treatment with little or no additional discomfort and was instructed to contact the office for questions or concerns.   Follow up as scheduled.

## 2024-10-31 ENCOUNTER — OFFICE VISIT (OUTPATIENT)
Dept: OBSTETRICS AND GYNECOLOGY | Facility: CLINIC | Age: 59
End: 2024-10-31
Payer: COMMERCIAL

## 2024-10-31 DIAGNOSIS — N32.81 OVERACTIVE BLADDER: Primary | ICD-10-CM

## 2024-10-31 PROCEDURE — 64566 NEUROELTRD STIM POST TIBIAL: CPT | Performed by: NURSE PRACTITIONER

## 2024-10-31 PROCEDURE — 99214 OFFICE O/P EST MOD 30 MIN: CPT | Performed by: NURSE PRACTITIONER

## 2024-10-31 PROCEDURE — 99214 OFFICE O/P EST MOD 30 MIN: CPT | Mod: 25 | Performed by: NURSE PRACTITIONER

## 2024-10-31 NOTE — PROGRESS NOTES
PERCUTANEOUS TIBIAL NERVE STIMULATION    Date/Time: 10/31/2024 9:16 AM    Performed by: DILIP Mejia  Authorized by: DILIP Mejia    Procedure Details     Indications: urinary urgency      PTNS session #: 7    Bladder symptoms: worsened      Ankle used: left      Stimulator intensity (mA): 9    Additional Details      Left foot and ankle cleaned with alcohol; grounding pad and needle placed without incident        59 y.o. female presenting for PTNS session #7. Reports experiencing some urinary urgency this past week. Denies burning, pain, or hesitancy.       Assessment & Plan:  59 y.o. female with OAB presenting for PTNS session #7. Comorbidities include: Prediabetes, Lesion of liver, Peptic ulcer, Chronic constipation, Anemia, Osteopenia.     Diagnosis List:  #1  OAB    Plan:  1. OAB  - PTNS session #7, L ankle, level 9 for 30 minutes. Tolerated well without complications.       Follow up in 1 week with DILIP Mejia.    Scribe Attestation  By signing my name below, IErlinda Scribe, attest that this documentation has been prepared under the direction and in the presence of DILIP Mejia on 10/31/2024 at 1:48 PM.   I, DILIP Mejia, personally performed the services described in this documentation which was scribed virtually and I confirm that it is both accurate and complete.

## 2024-11-05 ENCOUNTER — OFFICE VISIT (OUTPATIENT)
Dept: OBSTETRICS AND GYNECOLOGY | Facility: CLINIC | Age: 59
End: 2024-11-05
Payer: COMMERCIAL

## 2024-11-05 VITALS
BODY MASS INDEX: 29.45 KG/M2 | HEART RATE: 86 BPM | SYSTOLIC BLOOD PRESSURE: 99 MMHG | DIASTOLIC BLOOD PRESSURE: 65 MMHG | WEIGHT: 161 LBS

## 2024-11-05 DIAGNOSIS — N32.81 OVERACTIVE BLADDER: Primary | ICD-10-CM

## 2024-11-05 PROCEDURE — 64566 NEUROELTRD STIM POST TIBIAL: CPT | Performed by: NURSE PRACTITIONER

## 2024-11-05 PROCEDURE — 99214 OFFICE O/P EST MOD 30 MIN: CPT | Performed by: NURSE PRACTITIONER

## 2024-11-05 NOTE — PROGRESS NOTES
HPI:   59 year old female presenting for PTNS session #8 today to help manage OAB.     Urinary Symptoms:  - She has noticed an improvement in her urinary urgency and UUI since starting weekly PTNS.       PERCUTANEOUS TIBIAL NERVE STIMULATION    Date/Time: 11/5/2024 12:06 PM    Performed by: DILIP Mejia  Authorized by: DILIP Mejia    Procedure discussed: discussed risks, benefits and alternatives      Procedure Details     Indications: urinary urgency      PTNS session #: 8    Bladder symptoms: improved      Stimulator intensity (mA): 11    Additional Details      Ankle and foot cleaned with alcohol; grounding pad and needle placed without incident.         Assessment and Plan:  59 year old female with OAB presenting for PTNS session #8. Comorbidities include: Prediabetes.     Diagnoses:  #1 Urinary urgency  #2 Overactive bladder     Plan:  1. OAB, urinary urgency   - Patient completed PTNS session #8 today at level 11 for x30 minutes. She tolerated this procedure well and has noticed an improvement in her UUI leakage since starting PTNS.     Follow up in 1 week for PTNS session #9 with DILIP Mejia.     Scribe Attestation  By signing my name below, IKd Scribe, attest that this documentation has been prepared under the direction and in the presence of DILIP Mejia on 11/05/2024 at 12:10 PM.   I, DILIP Mejia, personally performed the services described in this documentation which was scribed virtually and I confirm that it is both accurate and complete.

## 2024-11-07 ENCOUNTER — TELEPHONE (OUTPATIENT)
Dept: OTOLARYNGOLOGY | Facility: CLINIC | Age: 59
End: 2024-11-07
Payer: COMMERCIAL

## 2024-11-07 NOTE — TELEPHONE ENCOUNTER
[facility] if she obtained a visual field test so we can submit to her insurance company for her upcoming surgery with Dr. Daugherty on 12/20/24.

## 2024-11-11 ENCOUNTER — TELEPHONE (OUTPATIENT)
Dept: PRIMARY CARE | Facility: CLINIC | Age: 59
End: 2024-11-11
Payer: COMMERCIAL

## 2024-11-11 DIAGNOSIS — H10.13 ALLERGIC CONJUNCTIVITIS OF BOTH EYES: ICD-10-CM

## 2024-11-11 DIAGNOSIS — J30.2 SEASONAL ALLERGIES: ICD-10-CM

## 2024-11-11 RX ORDER — KETOTIFEN FUMARATE 0.35 MG/ML
1 SOLUTION/ DROPS OPHTHALMIC 2 TIMES DAILY
Qty: 5 ML | Refills: 0 | OUTPATIENT
Start: 2024-11-11

## 2024-11-11 RX ORDER — CETIRIZINE HYDROCHLORIDE 10 MG/1
10 TABLET ORAL DAILY
Qty: 90 TABLET | Refills: 0 | OUTPATIENT
Start: 2024-11-11

## 2024-11-12 ENCOUNTER — TELEPHONE (OUTPATIENT)
Dept: OBSTETRICS AND GYNECOLOGY | Facility: CLINIC | Age: 59
End: 2024-11-12
Payer: COMMERCIAL

## 2024-11-12 ENCOUNTER — OFFICE VISIT (OUTPATIENT)
Dept: OBSTETRICS AND GYNECOLOGY | Facility: CLINIC | Age: 59
End: 2024-11-12
Payer: COMMERCIAL

## 2024-11-12 DIAGNOSIS — N39.41 URGE URINARY INCONTINENCE: Primary | ICD-10-CM

## 2024-11-12 PROCEDURE — 64566 NEUROELTRD STIM POST TIBIAL: CPT | Performed by: NURSE PRACTITIONER

## 2024-11-12 PROCEDURE — 99214 OFFICE O/P EST MOD 30 MIN: CPT | Performed by: NURSE PRACTITIONER

## 2024-11-12 NOTE — PROGRESS NOTES
59 y.o. female presents for PTNS session. Reports a decrease in frequency, no changes in urgency.       PERCUTANEOUS TIBIAL NERVE STIMULATION    Date/Time: 11/12/2024 9:57 AM    Performed by: DILIP Mejia  Authorized by: DILIP Mejia    Procedure Details     Indications: urge incontinence      PTNS session #: 9    Bladder symptoms: improved      Ankle used: left      Stimulator intensity (mA): 6    Additional Details      Left foot and ankle cleaned with alcohol. Grounding pad and needle placed without incident.   Still having some urgency but not leaking      Assessment & Plan  59 y.o. female with UUI presenting for PTNS session.     Diagnosis List:  #1 UUI    Plan:  1. UUI  - PTNS session #9, L ankle, level 6 for 30 minutes. Tolerated well without complications.     Follow up in 1 week with DILIP Mejia.    Scribe Attestation  By signing my name below, Erlinda HONEYCUTT Scribe, attest that this documentation has been prepared under the direction and in the presence of DILIP Mejia on 11/12/2024 at 2:20 PM.   I, DILIP Mejia, personally performed the services described in this documentation which was scribed virtually and I confirm that it is both accurate and complete.

## 2024-11-13 ENCOUNTER — APPOINTMENT (OUTPATIENT)
Dept: GASTROENTEROLOGY | Facility: CLINIC | Age: 59
End: 2024-11-13
Payer: COMMERCIAL

## 2024-11-13 VITALS — BODY MASS INDEX: 29.45 KG/M2 | HEART RATE: 75 BPM | HEIGHT: 62 IN | OXYGEN SATURATION: 99 %

## 2024-11-13 DIAGNOSIS — K21.9 GASTROESOPHAGEAL REFLUX DISEASE WITHOUT ESOPHAGITIS: Primary | ICD-10-CM

## 2024-11-13 DIAGNOSIS — K20.90 ESOPHAGITIS: ICD-10-CM

## 2024-11-13 PROCEDURE — 99214 OFFICE O/P EST MOD 30 MIN: CPT | Performed by: INTERNAL MEDICINE

## 2024-11-13 ASSESSMENT — ENCOUNTER SYMPTOMS
BACK PAIN: 1
ARTHRALGIAS: 1
MYALGIAS: 1

## 2024-11-13 NOTE — PROGRESS NOTES
Subjective     History of Present Illness:     58yo with MEL, osteopenia, overactive bladder,  GERD with history of esophagitis seen for latter.    On dexilant 30 mg daily with good control of symptoms.  Rare heartburn occasional breakthrough with diet.   Occasional dysphagia with soup; coffee fine. Occasional dysphagia to   Miralax most days with good bowel movements.  No blood or melena  No abdominal pain.   No complaints doing well.     EGD 1/2023 (heartburn, n/v)- normal esophagus, gastric erythema, normal duodenum. Gastric polyps. Lower esophagus bx normal; FGP, normal gastric bx, normal duodenal bx.  Colonoscopy 2/2022 - 5mm HP polyp , normal TI, diverticulosis, hemorrhoids.  EGD 2/2022- salmon mucosa, biopsies of esophagus, 1cm hiatal hernia, normal duodenum. Bx nonspecific gastritis, FGP, lower esophagus SCJ with acute and chronic inflammation and reactive changes. Proximal esophagus, neg EoE.  EGD 2019- reflux esophagitis with esophageal ulceration , gastritis      Fam hx: brother Crohns - requiring resection.   No history of colon cancer or polyps  Kids with reflux     Soc hx: no tob/etoh/illicits     Review of Systems  Review of Systems   Constitutional:         Night sweats     Musculoskeletal:  Positive for arthralgias, back pain and myalgias.       Past Medical History  Past Medical History:   Diagnosis Date    Abdominal bloating 08/13/2024    Acute headache 08/13/2024    Acute pharyngitis 06/03/2024    Acute sinusitis 08/13/2024    Acute upper respiratory infection 06/03/2024    Ankle pain 08/13/2024    Calf pain 08/13/2024    Candidiasis of vagina 09/07/2023    Chronic sinusitis, unspecified 03/29/2019    Viral sinusitis    Epidermal inclusion cyst of eyelid 08/13/2024    Epidermal inclusion cyst of eyelid 08/13/2024    Febrile urinary tract infection 08/13/2024    multiple ? age 3-4      Injury of right shoulder 08/13/2024    Local infection of wound 08/13/2024    Menopausal and female climacteric  \[facility] [place] [facility]\"" 08/16/2022    Perimenopause    Neck pain 09/21/2015    Pain of ear structure 08/13/2024    Personal history of other diseases of the respiratory system 12/29/2015    History of asthma    Personal history of other mental and behavioral disorders 08/04/2017    History of anxiety disorder    Pronation of both feet 10/21/2016       Social History  Social History     Tobacco Use    Smoking status: Never    Smokeless tobacco: Never   Vaping Use    Vaping status: Never Used       Family History  Family History   Problem Relation Name Age of Onset    Crohn's disease Father          Allergies  Allergies   Allergen Reactions    House Dust Unknown    Iodinated Contrast Media Unknown    Molds Extract Unknown       Medications  Current Outpatient Medications   Medication Instructions    ALPRAZolam (XANAX) 0.5 mg, oral, Once    atorvastatin (LIPITOR) 10 mg, oral, Daily    cetirizine (ZYRTEC) 10 mg, oral, Daily, as directed    dexlansoprazole (DEXILANT) 30 mg, oral, Every morning    docusate sodium (STOOL SOFTENER ORAL) No dose, route, or frequency recorded.    escitalopram (LEXAPRO) 20 mg, oral, Daily    estradiol (Estrace) 0.01 % (0.1 mg/gram) vaginal cream Apply 2 gm per vagina every night for two weeks. Then apply 1 gm twice a week for 8 weeks.    estradiol (ESTRACE) 1 g, vaginal, 2 times weekly, At bedtime for 2 weeks, then at bedtime twice a week.    ferrous sulfate (325 mg ferrous sulfate) (IRON) 325 mg, Daily with breakfast    FLAXSEED OIL ORAL No dose, route, or frequency recorded.    fluticasone (Flonase) 50 mcg/actuation nasal spray 2 sprays, Daily    fluticasone (Flonase) 50 mcg/actuation nasal spray 2 sprays, Each Nostril, Daily, Shake gently. Before first use, prime pump. After use, clean tip and replace cap.    fluticasone (Flonase) 50 mcg/actuation nasal spray 1 spray, Each Nostril, Daily, Shake gently. Before first use, prime pump. After use, clean tip and replace cap.    ketotifen (Zaditor) 0.025 % (0.035  %) ophthalmic solution 1 drop, Both Eyes, 2 times daily    magnesium oxide (Mag-Ox) 400 mg tablet No dose, route, or frequency recorded.    NON FORMULARY VITAMIN D 1000 UNIT CAPS        Objective   There were no vitals taken for this visit.   Physical Exam  Vitals reviewed.   Constitutional:       General: She is awake.   Cardiovascular:      Rate and Rhythm: Normal rate and regular rhythm.   Pulmonary:      Effort: Pulmonary effort is normal.      Breath sounds: Normal breath sounds.   Abdominal:      General: Bowel sounds are normal.      Palpations: Abdomen is soft.      Tenderness: There is no abdominal tenderness.   Neurological:      Mental Status: She is alert and oriented to person, place, and time.   Psychiatric:         Attention and Perception: Attention and perception normal.         Behavior: Behavior normal.               Assessment/Plan     58yo with MEL, osteopenia, overactive bladder,  GERD with history of esophagitis seen for latter.   Rare breakthrough related to dietary triggers otherwise very well controlled on dexilant 30 mg daily. Occasional oropharyngeal dysphagia with negative MBS. Breads likely due to dryness, recommend increasing liquid intake with dryer foods. No prior adenomatous polyps; recommend repeat colonoscopy 10 yrs from prior 2032.    Follow up as needed.    Hope Rivera MD

## 2024-11-14 ENCOUNTER — TELEPHONE (OUTPATIENT)
Dept: OPHTHALMOLOGY | Facility: CLINIC | Age: 59
End: 2024-11-14
Payer: COMMERCIAL

## 2024-11-15 ENCOUNTER — TELEPHONE (OUTPATIENT)
Dept: PRIMARY CARE | Facility: CLINIC | Age: 59
End: 2024-11-15

## 2024-11-15 ENCOUNTER — APPOINTMENT (OUTPATIENT)
Dept: PRIMARY CARE | Facility: CLINIC | Age: 59
End: 2024-11-15
Payer: COMMERCIAL

## 2024-11-15 ASSESSMENT — ENCOUNTER SYMPTOMS
RESPIRATORY NEGATIVE: 1
PSYCHIATRIC NEGATIVE: 1
HEADACHES: 1
BACK PAIN: 1
GASTROINTESTINAL NEGATIVE: 1
MYALGIAS: 1
CARDIOVASCULAR NEGATIVE: 1
HEMATOLOGIC/LYMPHATIC NEGATIVE: 1
EYES NEGATIVE: 1
NECK STIFFNESS: 1
CONSTITUTIONAL NEGATIVE: 1

## 2024-11-15 NOTE — TELEPHONE ENCOUNTER
Pt came in for her appointment and was late. PCP informed me to let the pt know she can still be seen she would just have to be after the patients that were on time for their appointments. She proceeded to ask the wait time and I let her know there was a physical and the appt is 40 minutes. She was upset and was saying that this office is very rude as she extended her voice at me. I informed her very politely that  has a zero tolerance policy for those type of behaviors and words of choice. She went to the front office to reschedule her appointment and flung the window back very hard. She was arguing with the front office and it was very upsetting. She proceeded to bad mouth the office more. I stated again the policy of  and let her know we will be happy to get her rescheduled and she did rescheduled and then proceeded to state to let her PCP know she will be looking for a knew office to attend to.

## 2024-11-18 ENCOUNTER — TELEPHONE (OUTPATIENT)
Dept: OTOLARYNGOLOGY | Facility: CLINIC | Age: 59
End: 2024-11-18
Payer: COMMERCIAL

## 2024-11-18 NOTE — TELEPHONE ENCOUNTER
Voicemail message sent to us stating that she would like to cancel her surgery scheduled with Dr. Daugherty in December in regards to having issues obtaining a visual field test. Message left for her to obtain more information and possible assistance with scheduling this procedure.

## 2024-11-19 ENCOUNTER — APPOINTMENT (OUTPATIENT)
Dept: OBSTETRICS AND GYNECOLOGY | Facility: CLINIC | Age: 59
End: 2024-11-19
Payer: COMMERCIAL

## 2024-11-19 ENCOUNTER — OFFICE VISIT (OUTPATIENT)
Dept: OBSTETRICS AND GYNECOLOGY | Facility: CLINIC | Age: 59
End: 2024-11-19
Payer: COMMERCIAL

## 2024-11-19 DIAGNOSIS — N39.41 URGE URINARY INCONTINENCE: Primary | ICD-10-CM

## 2024-11-19 PROCEDURE — 99214 OFFICE O/P EST MOD 30 MIN: CPT | Mod: 25 | Performed by: NURSE PRACTITIONER

## 2024-11-19 PROCEDURE — 99214 OFFICE O/P EST MOD 30 MIN: CPT | Performed by: NURSE PRACTITIONER

## 2024-11-19 PROCEDURE — 64566 NEUROELTRD STIM POST TIBIAL: CPT | Performed by: NURSE PRACTITIONER

## 2024-11-19 NOTE — PROGRESS NOTES
59 y.o. female presents for PTNS session #10. Reports experiencing some urinary urgency, which she has been able to manage without any incontinence. She is able to delay going to the bathroom when the urgency emerges.         PERCUTANEOUS TIBIAL NERVE STIMULATION    Date/Time: 11/19/2024 12:08 PM    Performed by: DILIP Mejia  Authorized by: DILIP Mejia    Procedure Details     Indications: urge incontinence      PTNS session #: 10    Bladder symptoms: improved      Ankle used: left      Stimulator intensity (mA): 17    Additional Details      No longer leaking but still managing urgency. Left foot and ankle cleaned with alcohol. Grounding pad and needle placed without incident.           Assessment and Plan  59 y.o. female with UUI presenting for PTNS session. Comorbidities include: Prediabetes, Chronic constipation, Osteopenia.    Diagnosis List:  #1 UUI    Plan:  1. UUI  - PTNS session #10, L ankle, Level 17 for 30 minutes. Tolerated well without immediate complications.     Follow up in 1 week with DILIP Mejia.    Scribe Attestation  By signing my name below, Erlinda HONEYCUTT Scribe, attest that this documentation has been prepared under the direction and in the presence of DILIP Mejia on 11/19/2024 at 7:18 PM.     I, DILIP Mejai, personally performed the services described in this documentation which was scribed virtually and I confirm that it is both accurate and complete.

## 2024-11-20 ENCOUNTER — APPOINTMENT (OUTPATIENT)
Dept: OBSTETRICS AND GYNECOLOGY | Facility: CLINIC | Age: 59
End: 2024-11-20
Payer: COMMERCIAL

## 2024-11-22 ENCOUNTER — APPOINTMENT (OUTPATIENT)
Dept: NUTRITION | Facility: CLINIC | Age: 59
End: 2024-11-22
Payer: COMMERCIAL

## 2024-11-25 ENCOUNTER — OFFICE VISIT (OUTPATIENT)
Dept: OBSTETRICS AND GYNECOLOGY | Facility: CLINIC | Age: 59
End: 2024-11-25
Payer: COMMERCIAL

## 2024-11-25 ENCOUNTER — APPOINTMENT (OUTPATIENT)
Dept: OBSTETRICS AND GYNECOLOGY | Facility: CLINIC | Age: 59
End: 2024-11-25
Payer: COMMERCIAL

## 2024-11-25 DIAGNOSIS — N39.41 URGE INCONTINENCE OF URINE: Primary | ICD-10-CM

## 2024-11-25 PROCEDURE — 99214 OFFICE O/P EST MOD 30 MIN: CPT | Performed by: NURSE PRACTITIONER

## 2024-11-25 PROCEDURE — 64566 NEUROELTRD STIM POST TIBIAL: CPT | Performed by: NURSE PRACTITIONER

## 2024-11-25 NOTE — PROGRESS NOTES
PERCUTANEOUS TIBIAL NERVE STIMULATION    Date/Time: 11/25/2024 11:52 AM    Performed by: DILIP Mjeia  Authorized by: DILIP Mejia    Procedure Details     PTNS session #: 11    Bladder symptoms: improved      Ankle used: left      Stimulator intensity (mA): 10    Additional Details      Left foot and ankle cleaned with alcohol and grounding pad and needle placed without incident.       Assessment and Plan  59 y.o. female with UUI presenting for PTNS session #11. Comorbidities include: Prediabetes, Chronic constipation, Osteopenia.     Diagnosis List:  #1 UUI     Plan:  1. UUI  - PTNS session #11, L ankle, Level 10 for 30 minutes. Tolerated well without immediate complications.      Follow up in 1 week with DILIP Mejia.    Scribe Attestation  By signing my name below, Eris HONEYCUTT Scribe, attest that this documentation has been prepared under the direction and in the presence of DILIP Mejia on 11/25/2024 at 12:03 PM.  I, DILIP Mejia, personally performed the services described in this documentation which was scribed virtually and I confirm that it is both accurate and complete.

## 2024-11-26 ENCOUNTER — APPOINTMENT (OUTPATIENT)
Dept: GASTROENTEROLOGY | Facility: CLINIC | Age: 59
End: 2024-11-26
Payer: COMMERCIAL

## 2024-12-02 ENCOUNTER — APPOINTMENT (OUTPATIENT)
Dept: OBSTETRICS AND GYNECOLOGY | Facility: CLINIC | Age: 59
End: 2024-12-02
Payer: COMMERCIAL

## 2024-12-03 ENCOUNTER — OFFICE VISIT (OUTPATIENT)
Dept: OBSTETRICS AND GYNECOLOGY | Facility: CLINIC | Age: 59
End: 2024-12-03
Payer: COMMERCIAL

## 2024-12-03 DIAGNOSIS — N39.41 URGE URINARY INCONTINENCE: Primary | ICD-10-CM

## 2024-12-03 PROCEDURE — 99214 OFFICE O/P EST MOD 30 MIN: CPT | Mod: 25 | Performed by: NURSE PRACTITIONER

## 2024-12-03 PROCEDURE — 99214 OFFICE O/P EST MOD 30 MIN: CPT | Performed by: NURSE PRACTITIONER

## 2024-12-03 PROCEDURE — 64566 NEUROELTRD STIM POST TIBIAL: CPT | Performed by: NURSE PRACTITIONER

## 2024-12-03 NOTE — PROGRESS NOTES
PERCUTANEOUS TIBIAL NERVE STIMULATION    Date/Time: 12/3/2024 9:43 AM    Performed by: DILIP Mejia  Authorized by: DILIP Mejia    Procedure Details     Indications: urinary urgency      PTNS session #: 12    Bladder symptoms: improved      Ankle used: left      Stimulator intensity (mA): 19    Additional Details     Left foot and ankle cleaned with alcohol and grounding pad and needle placed without incident.       Assessment and Plan  59 y.o. female with UUI presenting for PTNS session #12. Comorbidities include: Prediabetes, Chronic constipation, Osteopenia.     Diagnosis List:  #1 UUI     Plan:  1. UUI  - PTNS session #12, L ankle, Level 19 for 30 minutes. Tolerated well without immediate complications.      Follow up in 1 month with DILIP Mejia.  Will get information on [facility] for home therapy     Boris HONEYCUTT am scribing for virtually, and in the presence of DILIP Aden on 12/03/24 12:47 PM.   IViviana APRN-CNP, personally performed the services described in this documentation which was scribed virtually and I confirm that it is both accurate and complete.

## 2024-12-06 ENCOUNTER — APPOINTMENT (OUTPATIENT)
Dept: INTEGRATIVE MEDICINE | Facility: CLINIC | Age: 59
End: 2024-12-06
Payer: COMMERCIAL

## 2024-12-10 ENCOUNTER — APPOINTMENT (OUTPATIENT)
Dept: INTEGRATIVE MEDICINE | Facility: CLINIC | Age: 59
End: 2024-12-10
Payer: COMMERCIAL

## 2024-12-10 DIAGNOSIS — M99.04 SEGMENTAL AND SOMATIC DYSFUNCTION OF SACRAL REGION: ICD-10-CM

## 2024-12-10 DIAGNOSIS — M79.9 POSTURAL STRAIN: ICD-10-CM

## 2024-12-10 DIAGNOSIS — M99.02 SEGMENTAL AND SOMATIC DYSFUNCTION OF THORACIC REGION: ICD-10-CM

## 2024-12-10 DIAGNOSIS — M99.01 SEGMENTAL AND SOMATIC DYSFUNCTION OF CERVICAL REGION: ICD-10-CM

## 2024-12-10 DIAGNOSIS — M54.6 DORSALGIA OF CERVICOTHORACIC REGION: ICD-10-CM

## 2024-12-10 DIAGNOSIS — M99.03 SEGMENTAL AND SOMATIC DYSFUNCTION OF LUMBAR REGION: ICD-10-CM

## 2024-12-10 DIAGNOSIS — M54.59 MECHANICAL LOW BACK PAIN: Primary | ICD-10-CM

## 2024-12-10 DIAGNOSIS — M54.2 DORSALGIA OF CERVICOTHORACIC REGION: ICD-10-CM

## 2024-12-10 PROCEDURE — 98941 CHIROPRACT MANJ 3-4 REGIONS: CPT | Performed by: CHIROPRACTOR

## 2024-12-10 PROCEDURE — 97112 NEUROMUSCULAR REEDUCATION: CPT | Performed by: CHIROPRACTOR

## 2024-12-10 NOTE — PROGRESS NOTES
Subjective   Patient ID: Bibiana Chatterjee is a 59 y.o. female who presents December 11, 2024 for chiropractic care.    (2/24) [facility]    HPI : Bibiana presents to my office to continue chiropractic care. She was initially seen by my colleague, Dr. White. She reports longstanding pain localized along the lower neck and upper back without radiation. Notes discomfort into the scapular regions, L>R. She can sometimes experience related headaches. Lower back has been feeling tight and tense. She has noticed this more recently when doing yoga. This is a newer problem compared to the neck. No radicular complaints.    _____________________________________________________________________________  INITIAL HISTORY - Dr. White - 10/30/2024:  Bibiana Chatterjee presents for evaluation and treatment of chronic neck, upper back, and low back pain. She states that her low back pain occurs intermittently. She reports that the most recent episode started a couple of days. She states that upon waking, she noticed her back symptoms when she was reaching for something. She states that her symptoms started to dissipate throughout the day. She reports that her neck and upper back pain occurs predominately around the left shoulder blade. She states that her symptoms are daily but have been present for decades. She states that her headaches occur about once per month. She states that she has tried physical therapy and massage but finds that the results are temporary at best. She states that increased stress will aggravate her shoulder pain.     Objective   Physical Exam  Neurological:      General: No focal deficit present.      Mental Status: She is alert and oriented to person, place, and time.      Cranial Nerves: No dysarthria or facial asymmetry.      Sensory: Sensation is intact.      Motor: Motor function is intact.      Coordination: Coordination is intact.      Gait: Gait is intact. Gait normal.         Palpation of the following regions  revealed:  Cervical: Upper trapezius bilateral, hypertonicity and tenderness.  Levator scapulae bilateral, hypertonicity and tenderness.  Cervical paraspinals bilateral, hypertonicity and tenderness.  Thoracic: Rhomboids bilateral, hypertonicity and tenderness.  Pectoralis bilateral, hypertonicity and tenderness.  Lumbar: Lumbar paraspinals bilateral, muscular hypertonicity.  Quadratus lumborum bilateral, muscular hypertonicity.    Segmental Joint(s): Segmental joint dysfunction was assessed with motion palpation and is identified in the following areas:  Cervical : C7  Thoracic : T1, T3, T4, T5, and T12  Lumbopelvic / Sacral SIJ : L1, L5/S1, and R SIJ    Assessment/Plan   Today's Treatment Included: Spinal manipulation to the following regions of segmental dysfunction identified on examination, using age-appropriate and injury specific force. Segmental Joint(s) Cervical : C7 Segmental Joint(s) Thoracic : T1, T3, T4, T5, and T12 Segmental Joint(s) Lumbopelvic/Sacral SIJ : L1, L5/S1, and R SIJ  Chiropractic manipulation treatment techniques utilized: Pelvic drop table technique, Flexion-distraction technique, Activator/Tool assisted technique, and Cervical Manual Traction  Soft tissue mobilization techniques utilized during treatment: Active Release Technique and Pin and Stretch  Integrative Dry Needling (IDN) - Needles in/out:  6.  Neuromuscular Re-Education (98491): 2 Units; Start time: 1:00  End time: 1:25    IDN: C7-T1 PS, BL upper trap    Soft-tissue mobilization was performed in the following areas:  Cervical paraspinal mm. bilateral, Upper Trapezius bilateral, and Levator Scap. bilateral  Middle Trapezius bilateral, Rhomboids bilateral, Pectoralis bilateral, and Subscapularis bilateral  Lumbar Paraspinal mm. bilateral and Quadratus Lumborum bilateral    Recommended follow-up in 1 week(s).     The patient tolerated today's treatment with little or no additional discomfort and was instructed to contact the  office for questions or concerns.

## 2024-12-17 ENCOUNTER — APPOINTMENT (OUTPATIENT)
Dept: INTEGRATIVE MEDICINE | Facility: CLINIC | Age: 59
End: 2024-12-17
Payer: COMMERCIAL

## 2024-12-17 DIAGNOSIS — M99.03 SEGMENTAL AND SOMATIC DYSFUNCTION OF LUMBAR REGION: ICD-10-CM

## 2024-12-17 DIAGNOSIS — M99.04 SEGMENTAL AND SOMATIC DYSFUNCTION OF SACRAL REGION: ICD-10-CM

## 2024-12-17 DIAGNOSIS — M99.02 SEGMENTAL AND SOMATIC DYSFUNCTION OF THORACIC REGION: Primary | ICD-10-CM

## 2024-12-17 DIAGNOSIS — M54.2 DORSALGIA OF CERVICOTHORACIC REGION: ICD-10-CM

## 2024-12-17 DIAGNOSIS — M54.6 DORSALGIA OF CERVICOTHORACIC REGION: ICD-10-CM

## 2024-12-17 DIAGNOSIS — M79.9 POSTURAL STRAIN: ICD-10-CM

## 2024-12-17 DIAGNOSIS — M99.01 SEGMENTAL AND SOMATIC DYSFUNCTION OF CERVICAL REGION: ICD-10-CM

## 2024-12-17 DIAGNOSIS — M54.59 MECHANICAL LOW BACK PAIN: ICD-10-CM

## 2024-12-17 PROCEDURE — 97112 NEUROMUSCULAR REEDUCATION: CPT | Performed by: CHIROPRACTOR

## 2024-12-17 PROCEDURE — 98941 CHIROPRACT MANJ 3-4 REGIONS: CPT | Performed by: CHIROPRACTOR

## 2024-12-17 NOTE — PROGRESS NOTES
Subjective   Patient ID: Bibiana Chatterjee is a 59 y.o. female who presents December 17, 2024 for chiropractic care.    (3/24) [facility]    HPI : Bibiana presents to my office to continue chiropractic care with main complaints of lower neck, upper back and low back pain. She noticed some benefit and improvement following her last visit, feeling looser overall. No headaches over the past week. No radicular complaints reported. No new trauma or changes to health reported.    _____________________________________________________________________________  INITIAL HISTORY - Dr. White - 10/30/2024:  Bibiana Chatterjee presents for evaluation and treatment of chronic neck, upper back, and low back pain. She states that her low back pain occurs intermittently. She reports that the most recent episode started a couple of days. She states that upon waking, she noticed her back symptoms when she was reaching for something. She states that her symptoms started to dissipate throughout the day. She reports that her neck and upper back pain occurs predominately around the left shoulder blade. She states that her symptoms are daily but have been present for decades. She states that her headaches occur about once per month. She states that she has tried physical therapy and massage but finds that the results are temporary at best. She states that increased stress will aggravate her shoulder pain.     Objective   Physical Exam  Neurological:      General: No focal deficit present.      Mental Status: She is alert and oriented to person, place, and time.      Cranial Nerves: No dysarthria or facial asymmetry.      Sensory: Sensation is intact.      Motor: Motor function is intact.      Coordination: Coordination is intact.      Gait: Gait is intact. Gait normal.         Palpation of the following regions revealed:  Cervical: Upper trapezius bilateral, hypertonicity and tenderness.  Levator scapulae bilateral, hypertonicity and tenderness.  Cervical  paraspinals bilateral, hypertonicity and tenderness.  Thoracic: Rhomboids bilateral, hypertonicity and tenderness.  Pectoralis bilateral, hypertonicity and tenderness.  Lumbar: Lumbar paraspinals bilateral, muscular hypertonicity.  Quadratus lumborum bilateral, muscular hypertonicity.    Segmental Joint(s): Segmental joint dysfunction was assessed with motion palpation and is identified in the following areas:  Cervical : C5 C7  Thoracic : T1, T3, T4, T5, and T12  Lumbopelvic / Sacral SIJ : L1, L5/S1, and R SIJ    Assessment/Plan   Today's Treatment Included: Spinal manipulation to the following regions of segmental dysfunction identified on examination, using age-appropriate and injury specific force. Segmental Joint(s) Cervical : C5 C7 Segmental Joint(s) Thoracic : T1, T3, T4, T5, and T12 Segmental Joint(s) Lumbopelvic/Sacral SIJ : L1, L5/S1, and R SIJ  Chiropractic manipulation treatment techniques utilized: Pelvic drop table technique, Flexion-distraction technique, Activator/Tool assisted technique, and Cervical Manual Traction  Soft tissue mobilization techniques utilized during treatment: Active Release Technique and Pin and Stretch  Integrative Dry Needling (IDN) - Needles in/out:  8.  Neuromuscular Re-Education (06669): 2 Units; Start time: 1:00  End time: 1:25    IDN: C7-T1 PS, BL upper trap, BL L4 PS    Soft-tissue mobilization was performed in the following areas:  Cervical paraspinal mm. bilateral, Upper Trapezius bilateral, and Levator Scap. bilateral  Middle Trapezius bilateral, Rhomboids bilateral, Pectoralis bilateral, and Subscapularis bilateral  Lumbar Paraspinal mm. bilateral and Quadratus Lumborum bilateral    Recommended follow-up in 1 week(s).     The patient tolerated today's treatment with little or no additional discomfort and was instructed to contact the office for questions or concerns.

## 2024-12-24 ENCOUNTER — TELEPHONE (OUTPATIENT)
Dept: PRIMARY CARE | Facility: CLINIC | Age: 59
End: 2024-12-24
Payer: COMMERCIAL

## 2024-12-24 DIAGNOSIS — E83.52 HYPERCALCEMIA: Primary | ICD-10-CM

## 2025-01-03 ENCOUNTER — APPOINTMENT (OUTPATIENT)
Dept: INTEGRATIVE MEDICINE | Facility: CLINIC | Age: 60
End: 2025-01-03
Payer: COMMERCIAL

## 2025-01-06 DIAGNOSIS — H10.13 ALLERGIC CONJUNCTIVITIS OF BOTH EYES: ICD-10-CM

## 2025-01-06 DIAGNOSIS — E78.01 FAMILIAL HYPERCHOLESTEROLEMIA: ICD-10-CM

## 2025-01-06 DIAGNOSIS — J30.2 SEASONAL ALLERGIES: ICD-10-CM

## 2025-01-06 RX ORDER — ATORVASTATIN CALCIUM 10 MG/1
10 TABLET, FILM COATED ORAL DAILY
Qty: 90 TABLET | Refills: 0 | Status: SHIPPED | OUTPATIENT
Start: 2025-01-06

## 2025-01-06 RX ORDER — CETIRIZINE HYDROCHLORIDE 10 MG/1
10 TABLET ORAL DAILY
Qty: 90 TABLET | Refills: 0 | Status: SHIPPED | OUTPATIENT
Start: 2025-01-06

## 2025-01-06 RX ORDER — KETOTIFEN FUMARATE 0.35 MG/ML
1 SOLUTION/ DROPS OPHTHALMIC 2 TIMES DAILY
Qty: 5 ML | Refills: 0 | Status: SHIPPED | OUTPATIENT
Start: 2025-01-06 | End: 2025-01-09

## 2025-01-07 ENCOUNTER — OFFICE VISIT (OUTPATIENT)
Dept: OBSTETRICS AND GYNECOLOGY | Facility: CLINIC | Age: 60
End: 2025-01-07
Payer: COMMERCIAL

## 2025-01-07 ENCOUNTER — APPOINTMENT (OUTPATIENT)
Dept: INTEGRATIVE MEDICINE | Facility: CLINIC | Age: 60
End: 2025-01-07
Payer: COMMERCIAL

## 2025-01-07 VITALS
HEART RATE: 62 BPM | BODY MASS INDEX: 29.52 KG/M2 | DIASTOLIC BLOOD PRESSURE: 68 MMHG | SYSTOLIC BLOOD PRESSURE: 102 MMHG | WEIGHT: 161.4 LBS

## 2025-01-07 DIAGNOSIS — Z01.419 WELL WOMAN EXAM: Primary | ICD-10-CM

## 2025-01-07 DIAGNOSIS — Z12.31 BREAST CANCER SCREENING BY MAMMOGRAM: ICD-10-CM

## 2025-01-07 DIAGNOSIS — M54.6 DORSALGIA OF CERVICOTHORACIC REGION: ICD-10-CM

## 2025-01-07 DIAGNOSIS — M99.03 SEGMENTAL AND SOMATIC DYSFUNCTION OF LUMBAR REGION: ICD-10-CM

## 2025-01-07 DIAGNOSIS — M54.2 DORSALGIA OF CERVICOTHORACIC REGION: ICD-10-CM

## 2025-01-07 DIAGNOSIS — M54.59 MECHANICAL LOW BACK PAIN: ICD-10-CM

## 2025-01-07 DIAGNOSIS — M99.01 SEGMENTAL AND SOMATIC DYSFUNCTION OF CERVICAL REGION: ICD-10-CM

## 2025-01-07 DIAGNOSIS — M79.9 POSTURAL STRAIN: ICD-10-CM

## 2025-01-07 DIAGNOSIS — M99.02 SEGMENTAL AND SOMATIC DYSFUNCTION OF THORACIC REGION: Primary | ICD-10-CM

## 2025-01-07 DIAGNOSIS — M99.04 SEGMENTAL AND SOMATIC DYSFUNCTION OF SACRAL REGION: ICD-10-CM

## 2025-01-07 PROCEDURE — 98941 CHIROPRACT MANJ 3-4 REGIONS: CPT | Performed by: CHIROPRACTOR

## 2025-01-07 PROCEDURE — 97112 NEUROMUSCULAR REEDUCATION: CPT | Performed by: CHIROPRACTOR

## 2025-01-07 PROCEDURE — 99396 PREV VISIT EST AGE 40-64: CPT | Performed by: NURSE PRACTITIONER

## 2025-01-07 ASSESSMENT — ENCOUNTER SYMPTOMS
CONSTITUTIONAL NEGATIVE: 1
RESPIRATORY NEGATIVE: 1
ENDOCRINE NEGATIVE: 1
PSYCHIATRIC NEGATIVE: 1
MUSCULOSKELETAL NEGATIVE: 1
NEUROLOGICAL NEGATIVE: 1
HEMATOLOGIC/LYMPHATIC NEGATIVE: 1
GASTROINTESTINAL NEGATIVE: 1
CARDIOVASCULAR NEGATIVE: 1
EYES NEGATIVE: 1
ALLERGIC/IMMUNOLOGIC NEGATIVE: 1

## 2025-01-07 ASSESSMENT — PAIN SCALES - GENERAL: PAINLEVEL_OUTOF10: 0-NO PAIN

## 2025-01-07 NOTE — PROGRESS NOTES
Subjective   Bibiana Chatterjee is a 59 y.o. female who is here for a routine exam. Reports she has had no issues with her bladder.     Record review  - 2024 most recent mammogram, normal results.  -  most recent Pap smear, normal results. Next Pap due in .      Current contraception:  N/A  History of abnormal Pap smear: yes -  ASCUS, HPV neg  Family history of uterine or ovarian cancer: no  History of abnormal mammogram: no  Family history of breast cancer: no  History of abnormal lipids: no  Menstrual History:  OB History          3    Para   3    Term   3            AB        Living   3         SAB        IAB        Ectopic        Multiple        Live Births   3                  No LMP recorded (lmp unknown). Patient is postmenopausal.       Review of Systems   Constitutional: Negative.    HENT: Negative.     Eyes: Negative.    Respiratory: Negative.     Cardiovascular: Negative.    Gastrointestinal: Negative.    Endocrine: Negative.    Genitourinary: Negative.    Musculoskeletal: Negative.    Skin: Negative.    Allergic/Immunologic: Negative.    Neurological: Negative.    Hematological: Negative.    Psychiatric/Behavioral: Negative.         Objective   /68 (BP Location: Left arm, Patient Position: Sitting)   Pulse 62   Wt 73.2 kg (161 lb 6.4 oz)   LMP  (LMP Unknown)   BMI 29.52 kg/m²     Physical Exam  Constitutional:       Appearance: Normal appearance.   Genitourinary:      Vulva, bladder and urethral meatus normal.      No vaginal prolapse present.     Vaginal exam comments: Normal vaginal tissues, no dryness or irritation.      Uterus exam comments: Small, mobile uterus.   HENT:      Head: Normocephalic and atraumatic.   Neurological:      General: No focal deficit present.      Mental Status: She is alert and oriented to person, place, and time.   Psychiatric:         Mood and Affect: Mood normal.         Behavior: Behavior normal.         Thought Content: Thought content  normal.         Judgment: Judgment normal.         Assessment/Plan   59 y.o. female presenting for breast cancer screening by mammogram. Comorbidities include: Prediabetes, Anemia, Osteopenia.     Diagnosis List:  #1 Breast cancer screening by mammogram    Plan:  1. Breast cancer screening by mammogram  - Order placed for annual mammogram    Pelvic exam normal    OAB, will continue PTNS or can try Vivally when Rep comes in     Follow up in 1 year with DILIP Mejia for annual exam.    Scribe Attestation  By signing my name below, IErlinda Scribe, attest that this documentation has been prepared under the direction and in the presence of DILIP Mejia on 01/07/2025 at 1:00 PM.   I, DILIP Mejia, personally performed the services described in this documentation which was scribed virtually and I confirm that it is both accurate and complete.

## 2025-01-07 NOTE — PROGRESS NOTES
Subjective   Patient ID: Bibiana Chatterjee is a 59 y.o. female who presents January 7, 2025 for chiropractic care.    (1/24) [facility]    HPI : Bibiana presents to my office to continue chiropractic care with main complaints of lower neck, upper back and low back pain. She reports stiffness throughout the mid to low back, particularly upon waking. She uses a pillow support in the curve of the back when sitting for any prolonged period of time. She notes ongoing upper back and neck discomfort, which is newer overall. She takes yoga weekly which feels great but feels she should be able to be more mobile overall on a daily basis. No new trauma or changes to health reported.    _____________________________________________________________________________  INITIAL HISTORY - Dr. White - 10/30/2024:  Bibiana Chatterjee presents for evaluation and treatment of chronic neck, upper back, and low back pain. She states that her low back pain occurs intermittently. She reports that the most recent episode started a couple of days. She states that upon waking, she noticed her back symptoms when she was reaching for something. She states that her symptoms started to dissipate throughout the day. She reports that her neck and upper back pain occurs predominately around the left shoulder blade. She states that her symptoms are daily but have been present for decades. She states that her headaches occur about once per month. She states that she has tried physical therapy and massage but finds that the results are temporary at best. She states that increased stress will aggravate her shoulder pain.     Objective   Physical Exam  Neurological:      General: No focal deficit present.      Mental Status: She is alert and oriented to person, place, and time.      Cranial Nerves: No dysarthria or facial asymmetry.      Sensory: Sensation is intact.      Motor: Motor function is intact.      Coordination: Coordination is intact.      Gait: Gait is intact. Gait  normal.         Palpation of the following regions revealed:  Cervical: Upper trapezius bilateral, hypertonicity and tenderness.  Levator scapulae bilateral, hypertonicity and tenderness.  Cervical paraspinals bilateral, hypertonicity and tenderness.  Thoracic: Rhomboids bilateral, hypertonicity and tenderness.  Pectoralis bilateral, hypertonicity and tenderness.  Lumbar: Lumbar paraspinals bilateral, muscular hypertonicity.  Quadratus lumborum bilateral, muscular hypertonicity.    Segmental Joint(s): Segmental joint dysfunction was assessed with motion palpation and is identified in the following areas:  Cervical : C5 C7  Thoracic : T1, T3, T4, T5, and T12  Lumbopelvic / Sacral SIJ : L1, L5/S1, and R SIJ    Assessment/Plan   Today's Treatment Included: Spinal manipulation to the following regions of segmental dysfunction identified on examination, using age-appropriate and injury specific force. Segmental Joint(s) Cervical : C5 C7 Segmental Joint(s) Thoracic : T1, T3, T4, T5, and T12 Segmental Joint(s) Lumbopelvic/Sacral SIJ : L1, L5/S1, and R SIJ  Chiropractic manipulation treatment techniques utilized: Pelvic drop table technique, Flexion-distraction technique, Activator/Tool assisted technique, and Cervical Manual Traction  Soft tissue mobilization techniques utilized during treatment: Active Release Technique and Pin and Stretch  Integrative Dry Needling (IDN) - Needles in/out:  10.  Neuromuscular Re-Education (60923): 2 Units; Start time: 12:30  End time: 12:55    IDN: C7-T1 PS, BL upper trap, BL L4-5 PS    - Isolated CS mobilizations and activator; gentle supine TS div; prone drop table and F+D -    Soft-tissue mobilization was performed in the following areas:  Cervical paraspinal mm. bilateral, Upper Trapezius bilateral, and Levator Scap. bilateral  Middle Trapezius bilateral, Rhomboids bilateral, Pectoralis bilateral, and Subscapularis bilateral  Lumbar Paraspinal mm. bilateral and Quadratus Lumborum  bilateral    Recommended follow-up in 1 week(s).  Discussed consideration of yoga therapy for focused and personalized mobility.     The patient tolerated today's treatment with little or no additional discomfort and was instructed to contact the office for questions or concerns.

## 2025-01-13 ENCOUNTER — APPOINTMENT (OUTPATIENT)
Dept: INTEGRATIVE MEDICINE | Facility: CLINIC | Age: 60
End: 2025-01-13
Payer: COMMERCIAL

## 2025-01-13 DIAGNOSIS — M99.03 SEGMENTAL AND SOMATIC DYSFUNCTION OF LUMBAR REGION: ICD-10-CM

## 2025-01-13 DIAGNOSIS — M99.01 SEGMENTAL AND SOMATIC DYSFUNCTION OF CERVICAL REGION: ICD-10-CM

## 2025-01-13 DIAGNOSIS — M54.2 DORSALGIA OF CERVICOTHORACIC REGION: ICD-10-CM

## 2025-01-13 DIAGNOSIS — M79.10 MYALGIA: ICD-10-CM

## 2025-01-13 DIAGNOSIS — M54.6 DORSALGIA OF CERVICOTHORACIC REGION: ICD-10-CM

## 2025-01-13 DIAGNOSIS — M79.9 POSTURAL STRAIN: ICD-10-CM

## 2025-01-13 DIAGNOSIS — M99.02 SEGMENTAL AND SOMATIC DYSFUNCTION OF THORACIC REGION: Primary | ICD-10-CM

## 2025-01-13 PROCEDURE — 97112 NEUROMUSCULAR REEDUCATION: CPT | Performed by: CHIROPRACTOR

## 2025-01-13 PROCEDURE — 98941 CHIROPRACT MANJ 3-4 REGIONS: CPT | Performed by: CHIROPRACTOR

## 2025-01-13 NOTE — PROGRESS NOTES
Subjective   Patient ID: Bibiana Chatterjee is a 59 y.o. female who presents January 13, 2025 for chiropractic care.    (2/24) [facility]    HPI : Bibiana returns to my office for chiropractic care. Main complaint today is neck and upper back pain localized along the cervicothoracic region. No radicular complaints reported. Notes some increased personal stressors No new trauma or changes to health reported.    _____________________________________________________________________________  INITIAL HISTORY - Dr. White - 10/30/2024:  Bibiana Chatterjee presents for evaluation and treatment of chronic neck, upper back, and low back pain. She states that her low back pain occurs intermittently. She reports that the most recent episode started a couple of days. She states that upon waking, she noticed her back symptoms when she was reaching for something. She states that her symptoms started to dissipate throughout the day. She reports that her neck and upper back pain occurs predominately around the left shoulder blade. She states that her symptoms are daily but have been present for decades. She states that her headaches occur about once per month. She states that she has tried physical therapy and massage but finds that the results are temporary at best. She states that increased stress will aggravate her shoulder pain.     Objective   Physical Exam  Neurological:      General: No focal deficit present.      Mental Status: She is alert and oriented to person, place, and time.      Cranial Nerves: No dysarthria or facial asymmetry.      Sensory: Sensation is intact.      Motor: Motor function is intact.      Coordination: Coordination is intact.      Gait: Gait is intact. Gait normal.         Palpation of the following regions revealed:  Cervical: Upper trapezius bilateral, hypertonicity and tenderness.  Levator scapulae bilateral, hypertonicity and tenderness.  Cervical paraspinals bilateral, hypertonicity and tenderness.  Thoracic: Rhomboids  bilateral, hypertonicity and tenderness.  Pectoralis bilateral, hypertonicity and tenderness.  Lumbar: Lumbar paraspinals bilateral, muscular hypertonicity.  Quadratus lumborum bilateral, muscular hypertonicity.    Segmental Joint(s): Segmental joint dysfunction was assessed with motion palpation and is identified in the following areas:  Cervical : C5 C7  Thoracic : T1, T3, T4, T5, and T12  Lumbopelvic / Sacral SIJ : L1    Assessment/Plan   Today's Treatment Included: Spinal manipulation to the following regions of segmental dysfunction identified on examination, using age-appropriate and injury specific force. Segmental Joint(s) Cervical : C5 C7 Segmental Joint(s) Thoracic : T1, T3, T4, and T5   Chiropractic manipulation treatment techniques utilized: Activator/Tool assisted technique and Cervical Manual Traction  Soft tissue mobilization techniques utilized during treatment: Active Release Technique and Pin and Stretch  Integrative Dry Needling (IDN) - Needles in/out:  6.  Neuromuscular Re-Education (96083): 1 Units; Start time: 12:40  End time: 12:55    IDN: C7-T1 PS, BL upper trap    - Isolated CS mobilizations and activator; gentle supine TS div -    Soft-tissue mobilization was performed in the following areas:  Cervical paraspinal mm. bilateral, Upper Trapezius bilateral, and Levator Scap. bilateral  Middle Trapezius bilateral, Rhomboids bilateral, Pectoralis bilateral, and Subscapularis bilateral  Lumbar Paraspinal mm. bilateral and Quadratus Lumborum bilateral    Recommended follow-up in 1 week(s).  Discussed consideration of yoga therapy for focused and personalized mobility.     The patient tolerated today's treatment with little or no additional discomfort and was instructed to contact the office for questions or concerns.

## 2025-01-14 ENCOUNTER — APPOINTMENT (OUTPATIENT)
Dept: INTEGRATIVE MEDICINE | Facility: CLINIC | Age: 60
End: 2025-01-14
Payer: COMMERCIAL

## 2025-01-14 ENCOUNTER — ALLIED HEALTH (OUTPATIENT)
Dept: INTEGRATIVE MEDICINE | Facility: CLINIC | Age: 60
End: 2025-01-14
Payer: COMMERCIAL

## 2025-01-14 DIAGNOSIS — M54.2 DORSALGIA OF CERVICOTHORACIC REGION: Primary | ICD-10-CM

## 2025-01-14 DIAGNOSIS — M54.6 DORSALGIA OF CERVICOTHORACIC REGION: Primary | ICD-10-CM

## 2025-01-14 DIAGNOSIS — M54.59 MECHANICAL LOW BACK PAIN: ICD-10-CM

## 2025-01-14 PROCEDURE — 97110 THERAPEUTIC EXERCISES: CPT | Performed by: CHIROPRACTOR

## 2025-01-14 NOTE — PROGRESS NOTES
Subjective     PATIENT ID: Bibiana Chatterjee is a 59 y.o. female who presents today January 14, 2025 for a new patient yoga therapy evaluation.      SUBJECTIVE/CHECK-IN:  Bibiana Chatterjee arrives for yoga therapy evaluation and session. She is a patient known to me through chiropractic but is new to yoga therapy. She arrives to work on neck, low back mobility along with stress management. Bibiana has been dealing with many years of neck and upper back pain and tension. She reports about 1 year of low back stiffness and discomfort, particularly after waking in the morning. She deals with intermittent knee discomfort, often making any positions in a kneeling position or on a hard surface painful.    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 She is excited about this endeavor but some of the logistical aspects have been stressful. She has also been putting in more work hours than typical as she prepares for a long weekend trip out of town. She is good about stress management - regularly doing slow flow yoga, stretching and breath work. She reports good quality and quantity of sleep.       EVALUATION:    Lumbar ROM:   - Extension P+  - Flexion P+  - All other ROM without issue    Table Top:  - Requires padding beneath knees due to discomfort  - P+ in full expression of cow localized at C/T region  - Relief in full expression of cat    Breath Assessment:  - Performed supine  - Very subtle movement at chest/rib  - Deep belly expansion visualized      01/14/2025 SESSION:    1) Pranayama + Breath Awareness:   - Performed supine   - 3-Part-Breath; guided cueing    2) Asana:   - Long body stretch   - Single knee curl + spinal twist   - Seated head and neck series   - Cervical + UE self-massage and release   - Side body stretch   - Seated Cat-Cow   - Seated spinal cyclones   - Warrior 1 --> Reverse Warrior --> Extended Side Angle (repeat)   - Warrior 2 Pulsations (activation of arms for  resistance)    Goals:  - Reduce neck and back pain  - Reduce knee pain  - Stress management  - Home mobility program    Tools to Include:  - Asana, tools and trigger point release  - Breath work  - Guided imagery and physical relaxation practices    Suggested Tx: 1x/week for 4 sessions    ________________  Time In: 11:17 am  Time Out: 12:00 pm  ________________

## 2025-01-21 ENCOUNTER — APPOINTMENT (OUTPATIENT)
Dept: INTEGRATIVE MEDICINE | Facility: CLINIC | Age: 60
End: 2025-01-21
Payer: COMMERCIAL

## 2025-01-24 ENCOUNTER — APPOINTMENT (OUTPATIENT)
Dept: NUTRITION | Facility: CLINIC | Age: 60
End: 2025-01-24
Payer: COMMERCIAL

## 2025-01-24 DIAGNOSIS — R63.5 WEIGHT INCREASING: Primary | ICD-10-CM

## 2025-01-24 DIAGNOSIS — Z71.3 DIETARY COUNSELING AND SURVEILLANCE: ICD-10-CM

## 2025-01-24 DIAGNOSIS — R73.03 PREDIABETES: ICD-10-CM

## 2025-01-24 PROCEDURE — 97803 MED NUTRITION INDIV SUBSEQ: CPT | Performed by: DIETITIAN, REGISTERED

## 2025-01-24 NOTE — PATIENT INSTRUCTIONS
Discussed snacks and specific foods she questions whether she should be including in her diet. Talked about walking the line between pursuing weight loss and nurturing intuitive eating skills. Advised that if she desires weight loss, she may need to defer some of her intuitive eating choices but she shouldn't entirely deny herself or it could backfire in cravings.

## 2025-01-24 NOTE — PROGRESS NOTES
"Nutrition Follow Up Assessment:     Patient Bibiana Chatterjee is a 59 y.o. female being seen via virtual visit  who was initially referred by Safia Hernandez  on 7/2/24 for   1. Weight increasing        2. Dietary counseling and surveillance        3. Prediabetes          Last nutrition visit was completed on 9/24/24 with previous goals set by RDN & pt:   She is going to focus on portion control at upcoming holiday gatherings    Nutrition Assessment      Nutrition History:  Food & Nutrition History: She is having this follow up to discuss weight management / diabetes prevention. She follows a vegetarian & Kosher diet. 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 The main topic today is that she has re-gained weight (6# since our last visit in September) and she desires weight loss but also values IE. She is aware that IE isn't a guarantee of weight loss.  Food Intolerances: none  Mouth Issues:  (MBSS 9/27 rec'd regular diet w/ thin liquids. Saw GI 11/13 for GERD/esophagitis, moistened breads recommended); Teeth Issues:  (braces are off, but she said she isn't permitted to bite raw carrots)    Diet Recall:  Meal 1: B: yogurt with blueberries, \"feeling guilty about putting almond butter in it\"  Meal 2: L: 1 donut yesterday (was part of a social gathering yesterday, not a typical food for her to eat)  Snack 2: yogurt, berries, almond butter  Meal 3: D: brought a salad with vegetables, feta and chickpeas to work, she spread this out across a few breaks, finished at 7:30 when she arrived home  Snack 3: had friends over recently and bought mixed nuts with little bits of chocolate - she eats only a small portion but isn't sure if he wants to keep eating them.  Energy Intake: Good > 75 %      Food Preparation:  Cooking: Patient, Spouse/Significant Other  Grocery Shopping: Patient, Spouse/Significant Other  Dining Out:  (was in [place] recently for 5 days, ate biscuits / pizza which are foods she almost never " eats)    Physical Activity:   Didn't discuss exercise today        Anthropometrics:    Weight History:   Daily Weight  01/07/25 : 73.2 kg (161 lb 6.4 oz)  11/05/24 : 73 kg (161 lb)  10/08/24 : 71.7 kg (158 lb)  09/24/24 : 70.3 kg (155 lb)  09/06/24 : 70.8 kg (156 lb)  08/29/24 : 71.6 kg (157 lb 12.8 oz)  08/19/24 : 74.1 kg (163 lb 6.4 oz)  08/13/24 : 73 kg (161 lb)  07/19/24 : 73.9 kg (163 lb)  07/03/24 : 74.2 kg (163 lb 9.6 oz)      Nutrition Focused Physical Exam Findings:  Defer, virtual visit    Nutrition Significant Labs:  CBC Trend:   Recent Labs     08/14/24  1051 10/20/23  1325 02/21/23  1011   WBC 5.7 6.4 5.6   NRBC 0.0 0.0 0.0   RBC 4.41 4.36 4.35   HGB 13.0 13.0 12.5   HCT 40.2 40.2 38.9   MCV 91 92 89   MCH 29.5 29.8  --    MCHC 32.3 32.3 32.1   RDW 12.4 12.1 13.1    328 317   , RFP + Serum Mag Trend:   Recent Labs     08/14/24  1051 05/14/24  1035 03/05/24  1404 10/20/23  1325 02/21/23  1011 01/19/22  0840 01/07/22  1030   GLUCOSE 91 91 86   < > 92   < > 85    139 139   < > 138   < > 139   K 4.6 4.7 4.6   < > 5.5*   < > 4.2    105 103   < > 105   < > 103   CO2 29 27 28   < > 28   < > 30   ANIONGAP 11 12 13   < > 11   < > 10   BUN 14 9 17   < > 12   < > 11   CREATININE 0.88 0.88 0.74   < > 0.86   < > 0.84   EGFR 77 77 >90   < >  --   --   --    CALCIUM 10.5 10.2 10.6   < > 10.4   < > 10.7*   PHOS  --   --  3.8  --  3.4  --  3.3   ALBUMIN 4.3 4.9 4.5   < > 4.5   < > 4.6  4.6    < > = values in this interval not displayed.   , LFT Trend:   Recent Labs     08/14/24  1051 05/14/24  1035 03/05/24  1404 10/20/23  1325 01/19/22  0840 01/07/22  1030   ALBUMIN 4.3 4.9 4.5 4.4   < > 4.6  4.6   BILITOT 0.5 0.3  --  0.5   < > 0.5   BILIDIR  --   --   --   --   --  0.1   ALKPHOS 92 104  --  82   < > 120*   ALT 11 14  --  12   < > 11   AST 15 16  --  15   < > 14   PROT 6.7 6.9  --  6.8   < > 7.4    < > = values in this interval not displayed.   , DM Specific Labs Trend (includes HgbA1C):   Recent  "Labs     08/14/24  1051 01/05/24  0913 02/01/19  1303   HGBA1C 5.6 5.7* 5.8   , Lipid Panel Trend:    Recent Labs     08/14/24  1051 10/20/23  1325 09/25/20  0950   CHOL 243* 241* 222*   HDL 65.1 79.1 79.2   LDLCALC 158* 151*  --    LDLF  --   --  131*   VLDL 20 11 12   TRIG 102 57 60   , Iron Panel + Serum Ferritin Trend:   Recent Labs     10/20/23  1325 02/21/23  1011 10/25/22  1636   IRON 100 51 70   UIBC 281  --   --    TIBC 381 423 459*   IRONSAT 26 12* 15*   FERRITIN  --  20  --    , Vitamin B12:   Lab Results   Component Value Date    JJHZHQVK88 412 08/14/2024    , Folate: No results found for: \"FOLATE\" , and Vitamin D:   Lab Results   Component Value Date    VITD25 52 08/14/2024        Medications:  Current Outpatient Medications   Medication Instructions    ALPRAZolam (XANAX) 0.5 mg, oral, Once    atorvastatin (LIPITOR) 10 mg, oral, Daily    cetirizine (ZYRTEC) 10 mg, oral, Daily, as directed    dexlansoprazole (DEXILANT) 30 mg, oral, Every morning    docusate sodium (STOOL SOFTENER ORAL) No dose, route, or frequency recorded.    escitalopram (LEXAPRO) 20 mg, oral, Daily    estradiol (Estrace) 0.01 % (0.1 mg/gram) vaginal cream Apply 2 gm per vagina every night for two weeks. Then apply 1 gm twice a week for 8 weeks.    estradiol (ESTRACE) 1 g, vaginal, 2 times weekly, At bedtime for 2 weeks, then at bedtime twice a week.    ferrous sulfate (325 mg ferrous sulfate) (IRON) 325 mg, Daily with breakfast    FLAXSEED OIL ORAL No dose, route, or frequency recorded.    fluticasone (Flonase) 50 mcg/actuation nasal spray 2 sprays, Daily    fluticasone (Flonase) 50 mcg/actuation nasal spray 2 sprays, Each Nostril, Daily, Shake gently. Before first use, prime pump. After use, clean tip and replace cap.    fluticasone (Flonase) 50 mcg/actuation nasal spray 1 spray, Each Nostril, Daily, Shake gently. Before first use, prime pump. After use, clean tip and replace cap.    magnesium oxide (Mag-Ox) 400 mg tablet No dose, " route, or frequency recorded.    NON FORMULARY VITAMIN D 1000 UNIT CAPS        Nutrition Diagnosis   Malnutrition Diagnosis  Patient has Malnutrition Diagnosis: No    Nutrition Diagnosis  Patient has Nutrition Diagnosis: Yes  Nutrition Diagnosis 1: Altered nutrition related to laboratory values  Related to (1): endocrine and metabolic dysfunction  As Evidenced by (1): HgA1c of 5.7%, high cholesterol (241) and high LDL (151) on 10/20/2023.  Additional Assessment Information (1):  (Update: most recent A1C is 5.6% 8/14/24)  Additional Nutrition Diagnosis: Diagnosis 2  Nutrition Diagnosis 2: Food and nutrition related knowledge deficit  Related to (2): lack of or limited prior nutrition-related education  As Evidenced by (2): she has questions about food/diet       Nutrition Interventions/Recommendations   Nutrition Prescription: Oral nutrition Reduced-calorie diet    Nutrition Interventions:   Food and Nutrient Delivery: Meals & Snacks: Energy-modified diet     Coordination of Care: Collaboration and referral of nutrition care:  (N/A)     Nutrition Education:   Nutrition Education Content: Content related nutrition education    Nutrition Education Topics Discussed:   Discussed snacks and specific foods she questions whether she should be including in her diet. Talked about walking the line between pursuing weight loss and nurturing intuitive eating skills. Advised that if she desires weight loss, she may need to defer some of her intuitive eating choices but she shouldn't entirely deny herself or it could backfire in cravings.      Educational Handouts Provided: snack ideas (300 kcal/10 g pro, 200 kcal vegetarian/kosher)    Nutrition Counseling:   Nutrition Counseling Strategies : Nutrition counseling based on goal setting strategy        Readiness to Change : Good  Level of Understanding : Good  Anticipated Compliant : Good         Nutrition Monitoring and Evaluation   Food and Nutrient Intake  Monitoring and  Evaluation Plan: Amount of food  Amount of Food: Estimated amout of food, Types of food  Criteria: She will second-guess choices of food that are richer in calories/sugar, limiting number of times per week she chooses these              Biochemical Data, Medical Tests and Procedures  Monitoring and Evaluation Plan: Lipid profile, Glucose/endocrine profile              Follow Up: 3/14 at 1 pm virtual    Time Spent  Prep time on day of patient encounter: 5 minutes  Time spent directly with patient, family or caregiver: 30 minutes  Additional Time Spent on Patient Care Activities: 2 minutes  Documentation Time: 7 minutes  Other Time Spent: 0 minutes  Total: 44 minutes

## 2025-01-28 ENCOUNTER — OFFICE VISIT (OUTPATIENT)
Dept: OTOLARYNGOLOGY | Facility: CLINIC | Age: 60
End: 2025-01-28
Payer: COMMERCIAL

## 2025-01-28 ENCOUNTER — APPOINTMENT (OUTPATIENT)
Dept: INTEGRATIVE MEDICINE | Facility: CLINIC | Age: 60
End: 2025-01-28
Payer: COMMERCIAL

## 2025-01-28 ENCOUNTER — ALLIED HEALTH (OUTPATIENT)
Dept: INTEGRATIVE MEDICINE | Facility: CLINIC | Age: 60
End: 2025-01-28
Payer: COMMERCIAL

## 2025-01-28 VITALS — BODY MASS INDEX: 29.4 KG/M2 | HEIGHT: 62 IN | WEIGHT: 159.8 LBS

## 2025-01-28 DIAGNOSIS — M54.2 DORSALGIA OF CERVICOTHORACIC REGION: Primary | ICD-10-CM

## 2025-01-28 DIAGNOSIS — M54.59 MECHANICAL LOW BACK PAIN: ICD-10-CM

## 2025-01-28 DIAGNOSIS — R13.10 DYSPHAGIA, UNSPECIFIED TYPE: ICD-10-CM

## 2025-01-28 DIAGNOSIS — M25.562 CHRONIC PAIN OF LEFT KNEE: ICD-10-CM

## 2025-01-28 DIAGNOSIS — G89.29 CHRONIC PAIN OF LEFT KNEE: ICD-10-CM

## 2025-01-28 DIAGNOSIS — R09.81 NASAL SINUS CONGESTION: ICD-10-CM

## 2025-01-28 DIAGNOSIS — M54.6 DORSALGIA OF CERVICOTHORACIC REGION: Primary | ICD-10-CM

## 2025-01-28 DIAGNOSIS — H92.02 EAR DISCOMFORT, LEFT: ICD-10-CM

## 2025-01-28 DIAGNOSIS — H93.8X2 EAR PRESSURE, LEFT: Primary | ICD-10-CM

## 2025-01-28 PROCEDURE — 99212 OFFICE O/P EST SF 10 MIN: CPT

## 2025-01-28 PROCEDURE — 3008F BODY MASS INDEX DOCD: CPT

## 2025-01-28 PROCEDURE — 97110 THERAPEUTIC EXERCISES: CPT | Performed by: CHIROPRACTOR

## 2025-01-28 ASSESSMENT — PATIENT HEALTH QUESTIONNAIRE - PHQ9
SUM OF ALL RESPONSES TO PHQ9 QUESTIONS 1 AND 2: 0
1. LITTLE INTEREST OR PLEASURE IN DOING THINGS: NOT AT ALL
2. FEELING DOWN, DEPRESSED OR HOPELESS: NOT AT ALL

## 2025-01-28 NOTE — PROGRESS NOTES
Subjective     PATIENT ID: Bibiana Chatterjee is a 59 y.o. female who presents today January 28, 2025 for yoga therapy.      SUBJECTIVE/CHECK-IN:  Bibiana Chatterjee arrives for her second yoga therapy session. Today she is experiencing right foot, left knee and neck and upper back discomfort. No new inciting trauma/incident. Patient notes she had a wonderful trip to [place] since the time of our initial meeting. 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      01/28/2025 SESSION:    1) Pranayama + Breath Awareness:   - Performed in reclined yoga 'easy chair' with supports   - Body scan (toes to head)   - Breath awareness and expansion (into LE; extended exhale)    2) Asana:   - Reclined/Supine:    - Ankle, foot and wrist mobility    - Long body stretch    - Windshield wiper legs    - Hip circles --> knee flex/ext     - Seated:    - Goal post shoulder cat-cow    - Neck ROM    - Cervical + UE self-massage and release    - Seated spinal twist    - Hamstring sweeps     - Seated Figure-4 stretch     - Standing:    - Strap-supported Warrior 2 Pulsations (activation of arms for resistance)    - Strap-supported Warrior 1-->Reverse Warrior --> Extended Side Angle    - Standing hip flexor stretch    - Balance postures (single leg --> tree --> sandbag supported tree)     - Supine:    - Supine spinal twist    3) Relaxation:   - Supine with bolster support   - Breath relaxation   - Physical STM to temporalis, masseter and sinus drainage performed by me    Goals:  - Reduce neck and back pain  - Reduce knee pain  - Stress management  - Home mobility program    Tools to Include:  - Asana, tools and trigger point release  - Breath work  - Guided imagery and physical relaxation practices    Suggested Tx: 1x/week for 4 sessions    ________________  Time In: 10:02 am  Time Out: 11:00 pm  ________________

## 2025-01-28 NOTE — PROGRESS NOTES
Chief Complaint  Chief Complaint   Patient presents with    Follow-up        Pertinent History:  8/29/24: Patient last seen in August for swallowing difficulties.  Interval History  Since last visit patient reports she is coming in today for increased ear pressure and nasal congestion with sinus pressure. Patient reports previous swallowing issues are better.     Exam:  VOICE: normal  RESPIRATION: Breathing comfortably, no stridor.    ORAL CAVITY/OROPHARYNX/LIPS: Normal mucous membranes, normal floor of mouth/tongue/OP, no masses or lesions are noted.    SKIN: Neck skin is without scar or injury.    PSYCH: Alert and oriented with appropriate mood and affect.         Assessment and Plan:  This is a follow up visit for recurrent ear pressure on left nasal congestion/drainage with sinus pressure. Patient has increased fluid, but does not appear infected-TM left ear.  Discussed use of flonase daily 2 sprays each side. Will have patient use Claritin-D for 5-7 days. Referral placed for Otology to assess the recurrent ear issues. Patient agreeable to plan, all questions answered.

## 2025-02-03 ENCOUNTER — TELEPHONE (OUTPATIENT)
Dept: OBSTETRICS AND GYNECOLOGY | Facility: CLINIC | Age: 60
End: 2025-02-03
Payer: COMMERCIAL

## 2025-02-03 DIAGNOSIS — N95.1 VAGINAL DRYNESS, MENOPAUSAL: ICD-10-CM

## 2025-02-03 DIAGNOSIS — Z78.0 MENOPAUSE: ICD-10-CM

## 2025-02-03 RX ORDER — ESTRADIOL 0.1 MG/G
1 CREAM VAGINAL 2 TIMES WEEKLY
Qty: 34 G | Refills: 3 | Status: SHIPPED | OUTPATIENT
Start: 2025-02-03 | End: 2025-02-04

## 2025-02-04 ENCOUNTER — ALLIED HEALTH (OUTPATIENT)
Dept: INTEGRATIVE MEDICINE | Facility: CLINIC | Age: 60
End: 2025-02-04
Payer: COMMERCIAL

## 2025-02-04 ENCOUNTER — TELEPHONE (OUTPATIENT)
Dept: OBSTETRICS AND GYNECOLOGY | Facility: CLINIC | Age: 60
End: 2025-02-04

## 2025-02-04 ENCOUNTER — APPOINTMENT (OUTPATIENT)
Dept: INTEGRATIVE MEDICINE | Facility: CLINIC | Age: 60
End: 2025-02-04
Payer: COMMERCIAL

## 2025-02-04 DIAGNOSIS — N95.1 VAGINAL DRYNESS, MENOPAUSAL: ICD-10-CM

## 2025-02-04 DIAGNOSIS — M54.2 DORSALGIA OF CERVICOTHORACIC REGION: ICD-10-CM

## 2025-02-04 DIAGNOSIS — M54.6 DORSALGIA OF CERVICOTHORACIC REGION: ICD-10-CM

## 2025-02-04 DIAGNOSIS — M54.59 MECHANICAL LOW BACK PAIN: Primary | ICD-10-CM

## 2025-02-04 PROCEDURE — 97110 THERAPEUTIC EXERCISES: CPT | Performed by: CHIROPRACTOR

## 2025-02-04 RX ORDER — ESTRADIOL 10 UG/1
10 INSERT VAGINAL 2 TIMES WEEKLY
Qty: 24 TABLET | Refills: 3 | Status: SHIPPED | OUTPATIENT
Start: 2025-02-06 | End: 2025-02-04 | Stop reason: ENTERED-IN-ERROR

## 2025-02-04 NOTE — TELEPHONE ENCOUNTER
Request received for tablet since cream is not covered by insurance  Requested Prescriptions     Pending Prescriptions Disp Refills    estradiol (Vagifem) 10 mcg tablet vaginal tablet [Pharmacy Med Name: VAGIFEM 10 MCG VAGINAL TAB] 24 tablet 3     Sig: Insert 1 tablet (10 mcg) into the vagina 2 times a week.       Bibiana Chatterjee was last seen 1/7/2025

## 2025-02-04 NOTE — PROGRESS NOTES
Subjective     PATIENT ID: Bibiana Chatterjee is a 59 y.o. female who presents today February 4, 2025 for yoga therapy.      SUBJECTIVE/CHECK-IN:  Bibiana returns for yoga therapy care. She reports ongoing neck and upper back tension along with lower back discomfort. Notes a slightly stressful morning upon arrival today. No new inciting trauma/incident. Will be leaving for a week in [place] at the end of this week. No other changes noted.      02/04/2025 SESSION:    1) Pranayama + Breath Awareness:   - Performed in reclined yoga 'easy chair' with supports   - Breath awareness -> directional expansion --> two-part breath    2) Asana:   - Reclined/Supine:    - Ankle, foot and wrist mobility    - LE joint freeing    - Windshield wiper legs    - Hip circles --> knee pulses    - Supine figure-4 --> + twist     - Seated:    - Side body stretch  - Goal post/knee supported shoulder cat-cow  - Spinal/hip cyclones    - Cervical + UE self-massage and release    - Hamstring sweeps     - Seated Figure-4 stretch     - Table Top:    - Cat Cow    - Perry Pose ( + hand creep R/L)    - Calf pulses    - Bird Dog activations     - Standing:    - Mountain Pose    - Hayes 1 (hip flxr stretch)    - Warrior 2 Pulsations --> Reverse --> Ext Warrior    3) Relaxation:   - Seated with bolster on lap   - Yoga Nidra script (credit: Letty Cooper)    Goals:  - Reduce neck and back pain  - Reduce knee pain  - Stress management  - Home mobility program    Suggested Tx: 1x/week for 4 sessions    ________________  Time In: 11:05 am  Time Out: 12:00 pm  ________________

## 2025-02-05 DIAGNOSIS — N95.1 VAGINAL DRYNESS, MENOPAUSAL: ICD-10-CM

## 2025-02-05 RX ORDER — ESTRADIOL 0.1 MG/G
1 CREAM VAGINAL 2 TIMES WEEKLY
Qty: 44 G | Refills: 2 | Status: SHIPPED | OUTPATIENT
Start: 2025-02-06

## 2025-02-07 ENCOUNTER — APPOINTMENT (OUTPATIENT)
Dept: RADIOLOGY | Facility: CLINIC | Age: 60
End: 2025-02-07
Payer: COMMERCIAL

## 2025-02-11 ENCOUNTER — APPOINTMENT (OUTPATIENT)
Dept: INTEGRATIVE MEDICINE | Facility: CLINIC | Age: 60
End: 2025-02-11
Payer: COMMERCIAL

## 2025-02-19 DIAGNOSIS — K21.9 GASTROESOPHAGEAL REFLUX DISEASE WITHOUT ESOPHAGITIS: Primary | ICD-10-CM

## 2025-02-19 NOTE — TELEPHONE ENCOUNTER
Request received for   Requested Prescriptions     Pending Prescriptions Disp Refills    estradiol (Vagifem) 10 mcg tablet vaginal tablet [Pharmacy Med Name: VAGIFEM 10 MCG VAGINAL TAB] 8 tablet 3     Sig: Insert 1 tablet (10 mcg) into the vagina 2 times a week.       Bibiana Chatterjee was last seen 1/7/2025 and has an appt for 3/14/2025.

## 2025-02-21 RX ORDER — ESTRADIOL 10 UG/1
10 TABLET, FILM COATED VAGINAL 2 TIMES WEEKLY
Qty: 8 TABLET | Refills: 3 | Status: SHIPPED | OUTPATIENT
Start: 2025-02-24

## 2025-02-25 ENCOUNTER — APPOINTMENT (OUTPATIENT)
Dept: INTEGRATIVE MEDICINE | Facility: CLINIC | Age: 60
End: 2025-02-25
Payer: COMMERCIAL

## 2025-03-11 ENCOUNTER — APPOINTMENT (OUTPATIENT)
Dept: INTEGRATIVE MEDICINE | Facility: CLINIC | Age: 60
End: 2025-03-11
Payer: COMMERCIAL

## 2025-03-11 DIAGNOSIS — M54.2 DORSALGIA OF CERVICOTHORACIC REGION: ICD-10-CM

## 2025-03-11 DIAGNOSIS — M54.59 MECHANICAL LOW BACK PAIN: Primary | ICD-10-CM

## 2025-03-11 DIAGNOSIS — M54.6 DORSALGIA OF CERVICOTHORACIC REGION: ICD-10-CM

## 2025-03-11 PROCEDURE — 97110 THERAPEUTIC EXERCISES: CPT | Performed by: CHIROPRACTOR

## 2025-03-11 NOTE — PROGRESS NOTES
Subjective     PATIENT ID: Bibiana Chatterjee is a 59 y.o. female who presents today March 11, 2025 for yoga therapy.      SUBJECTIVE/CHECK-IN:  Bibiana returns for yoga therapy care. 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 Notes that her sleep has been affected quite a bit as she has been working very late hours with reduced quality and quantity of sleep. She reports tension into the shoulder blades, neck and upper back. Has not had as much time for self care recently. No other changes noted.      03/11/2025 SESSION:    1) Pranayama + Breath Awareness:   - Supine with bolster support   - Breath expansion and direction    2) Asana:   - Seated:    - Head and neck series    - Self massage neck and trapezius    - Side body stretch  - Goal post supported shoulder cat-cow  - Spinal/hip cyclones     - Table Top:    - Thread the Needle    3) Relaxation:   - Supine with bolster support   - Body scan + relaxation    Goals:  - Reduce neck and back pain  - Reduce knee pain  - Stress management  - Home mobility program    ________________  Time In: 11:20 am  Time Out: 12:00 pm  ________________

## 2025-03-14 ENCOUNTER — APPOINTMENT (OUTPATIENT)
Dept: NUTRITION | Facility: CLINIC | Age: 60
End: 2025-03-14
Payer: COMMERCIAL

## 2025-03-14 ENCOUNTER — APPOINTMENT (OUTPATIENT)
Dept: OBSTETRICS AND GYNECOLOGY | Facility: CLINIC | Age: 60
End: 2025-03-14
Payer: COMMERCIAL

## 2025-03-18 ENCOUNTER — APPOINTMENT (OUTPATIENT)
Dept: PRIMARY CARE | Facility: CLINIC | Age: 60
End: 2025-03-18
Payer: COMMERCIAL

## 2025-03-18 VITALS
BODY MASS INDEX: 28.23 KG/M2 | OXYGEN SATURATION: 93 % | DIASTOLIC BLOOD PRESSURE: 66 MMHG | SYSTOLIC BLOOD PRESSURE: 98 MMHG | HEART RATE: 82 BPM | HEIGHT: 62 IN | WEIGHT: 153.4 LBS | TEMPERATURE: 97.1 F

## 2025-03-18 DIAGNOSIS — Z13.220 LIPID SCREENING: ICD-10-CM

## 2025-03-18 DIAGNOSIS — F43.9 STRESS: Primary | ICD-10-CM

## 2025-03-18 DIAGNOSIS — K30 GASTROINTESTINAL DISCOMFORT: ICD-10-CM

## 2025-03-18 PROCEDURE — 99213 OFFICE O/P EST LOW 20 MIN: CPT | Performed by: FAMILY MEDICINE

## 2025-03-18 PROCEDURE — 3008F BODY MASS INDEX DOCD: CPT | Performed by: FAMILY MEDICINE

## 2025-03-18 RX ORDER — DEXLANSOPRAZOLE 30 MG/1
30 CAPSULE, DELAYED RELEASE ORAL EVERY MORNING
Qty: 90 CAPSULE | Refills: 3 | Status: SHIPPED | OUTPATIENT
Start: 2025-03-18

## 2025-03-18 ASSESSMENT — COLUMBIA-SUICIDE SEVERITY RATING SCALE - C-SSRS
1. IN THE PAST MONTH, HAVE YOU WISHED YOU WERE DEAD OR WISHED YOU COULD GO TO SLEEP AND NOT WAKE UP?: NO
2. HAVE YOU ACTUALLY HAD ANY THOUGHTS OF KILLING YOURSELF?: NO
6. HAVE YOU EVER DONE ANYTHING, STARTED TO DO ANYTHING, OR PREPARED TO DO ANYTHING TO END YOUR LIFE?: NO

## 2025-03-18 NOTE — PATIENT INSTRUCTIONS
1.  Every day sleep  at night or rest ( some days we are unable to sleep )around the same time without the TV-this is important habit to reduce dementia and aging prematurely    2.  Eat 3 cups of green leafy vegetables daily include at least 1 fruit.,  Reduce animal protein consumption-/ also  Starch  consumption  --this will help to lose weight avoid illnesses such as dementia high blood pressure cancer.,  Diabetes    3.  Exercise including aerobics as well as resistant exercise for at least 45 minutes a day for 5 days this will also help to reduce premature aging  -- I would like you to have a target heart rate around 120 and maintain that for 30-minute    Dr. Angelia Mo at [place]

## 2025-03-18 NOTE — PROGRESS NOTES
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 but now she is organizing herself and looking for leasing space to start on her own    She is  mother of 3 adult children    The transition has been extremely stressful and is here to introduce herself and start here as my patient as well as she wanted me to prescribe Dexilant which has been given to her some years ago by her previous doctor    She also goes to a chiropractor at [facility] for stretching exercises for upper back pain which is helping her tremendously    She also had questions about nutrition    She takes Lipitor for her hyperlipidemia    She had reschedule her endoscopy I encouraged her to have the endoscopy done but I refilled her Dexilant    Since she has not had blood draw for some time I ordered the blood draw and referred her to  lab    Since she likes to learn about nutrition and healthy habits referred her to [facility] to meet Dr. Maryam Jerome     Advised her to come back for her annual physical exam

## 2025-03-20 ENCOUNTER — HOSPITAL ENCOUNTER (OUTPATIENT)
Dept: RADIOLOGY | Facility: CLINIC | Age: 60
Discharge: HOME | End: 2025-03-20
Payer: COMMERCIAL

## 2025-03-20 VITALS — WEIGHT: 153.4 LBS | HEIGHT: 62 IN | BODY MASS INDEX: 28.23 KG/M2

## 2025-03-20 DIAGNOSIS — Z12.31 BREAST CANCER SCREENING BY MAMMOGRAM: ICD-10-CM

## 2025-03-20 PROCEDURE — 77067 SCR MAMMO BI INCL CAD: CPT | Performed by: RADIOLOGY

## 2025-03-20 PROCEDURE — 77063 BREAST TOMOSYNTHESIS BI: CPT

## 2025-03-20 PROCEDURE — 77063 BREAST TOMOSYNTHESIS BI: CPT | Performed by: RADIOLOGY

## 2025-03-21 ENCOUNTER — APPOINTMENT (OUTPATIENT)
Dept: RADIOLOGY | Facility: CLINIC | Age: 60
End: 2025-03-21
Payer: COMMERCIAL

## 2025-03-21 LAB
25(OH)D3+25(OH)D2 SERPL-MCNC: 49 NG/ML (ref 30–100)
ALBUMIN SERPL-MCNC: 4.7 G/DL (ref 3.6–5.1)
ALP SERPL-CCNC: 96 U/L (ref 37–153)
ALT SERPL-CCNC: 9 U/L (ref 6–29)
ANION GAP SERPL CALCULATED.4IONS-SCNC: 8 MMOL/L (CALC) (ref 7–17)
AST SERPL-CCNC: 14 U/L (ref 10–35)
BASOPHILS # BLD AUTO: 58 CELLS/UL (ref 0–200)
BASOPHILS NFR BLD AUTO: 1.1 %
BILIRUB SERPL-MCNC: 0.4 MG/DL (ref 0.2–1.2)
BUN SERPL-MCNC: 15 MG/DL (ref 7–25)
CALCIUM SERPL-MCNC: 10.7 MG/DL (ref 8.6–10.4)
CHLORIDE SERPL-SCNC: 106 MMOL/L (ref 98–110)
CHOLEST SERPL-MCNC: 175 MG/DL
CHOLEST/HDLC SERPL: 2.4 (CALC)
CO2 SERPL-SCNC: 26 MMOL/L (ref 20–32)
CREAT SERPL-MCNC: 0.79 MG/DL (ref 0.5–1.03)
EGFRCR SERPLBLD CKD-EPI 2021: 87 ML/MIN/1.73M2
EOSINOPHIL # BLD AUTO: 180 CELLS/UL (ref 15–500)
EOSINOPHIL NFR BLD AUTO: 3.4 %
ERYTHROCYTE [DISTWIDTH] IN BLOOD BY AUTOMATED COUNT: 12.1 % (ref 11–15)
GLUCOSE SERPL-MCNC: 90 MG/DL (ref 65–99)
HCT VFR BLD AUTO: 40.3 % (ref 35–45)
HDLC SERPL-MCNC: 74 MG/DL
HGB BLD-MCNC: 13.5 G/DL (ref 11.7–15.5)
LDLC SERPL CALC-MCNC: 88 MG/DL (CALC)
LYMPHOCYTES # BLD AUTO: 2449 CELLS/UL (ref 850–3900)
LYMPHOCYTES NFR BLD AUTO: 46.2 %
MCH RBC QN AUTO: 30.3 PG (ref 27–33)
MCHC RBC AUTO-ENTMCNC: 33.5 G/DL (ref 32–36)
MCV RBC AUTO: 90.6 FL (ref 80–100)
MONOCYTES # BLD AUTO: 419 CELLS/UL (ref 200–950)
MONOCYTES NFR BLD AUTO: 7.9 %
NEUTROPHILS # BLD AUTO: 2194 CELLS/UL (ref 1500–7800)
NEUTROPHILS NFR BLD AUTO: 41.4 %
NONHDLC SERPL-MCNC: 101 MG/DL (CALC)
PLATELET # BLD AUTO: 305 THOUSAND/UL (ref 140–400)
PMV BLD REES-ECKER: 10.9 FL (ref 7.5–12.5)
POTASSIUM SERPL-SCNC: 5.1 MMOL/L (ref 3.5–5.3)
PROT SERPL-MCNC: 7 G/DL (ref 6.1–8.1)
PTH-INTACT SERPL-MCNC: 80 PG/ML (ref 16–77)
RBC # BLD AUTO: 4.45 MILLION/UL (ref 3.8–5.1)
SODIUM SERPL-SCNC: 140 MMOL/L (ref 135–146)
TRIGL SERPL-MCNC: 50 MG/DL
TSH SERPL-ACNC: 1.29 MIU/L (ref 0.4–4.5)
WBC # BLD AUTO: 5.3 THOUSAND/UL (ref 3.8–10.8)

## 2025-03-27 NOTE — PROGRESS NOTES
Bibiana Chatterjee is a 57 yo F presenting for a follow up visit.    1. 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 Vasomotor symptoms s/p new start HRT 8.2022   -progesterone 100   -estradiol patch   -rx'ed by gyne   -still having hot flashes    -discussed risk/benefit   -pt amenable trial off x1 month     3. Dry mouth with +FH Sjogrens     4. Low energy with low normal Fe levels 3.23   -started Slow Fe     5. Overweight s/p new start ?meal plan in spring 2023     6. MDD   -Lexapro 10   -Wellbutrin 150 started 3.2023 with benefit    7. GERD s/p EGD 2.22   -last saw GI 1.2022   -Dexilant 30     #HM   -due fasting lipids, basic labs, repeat iron   -mammo due 11.17.23   -tdap 2015   -shingrix - [ ]dose #2 today   -cac zero in 2.2022   -dexa 5.22 osteopenia  -cscope 2.22 - 10 yrs   -egd 2.22 - 2.25           62   161 in 2.23       Gen Aox3 NAD well appearing   HEENT mmm pharynx clear no cervical LAD   Eyes sclerae clear   CV rrr nl s1/2 no m/r/g      A/P   Bibiana     Call [phone] to schedule mammogram 11-17-23 or later.   Do lab work anytime this month fasting from midnight the night before (water, black coffee, tea - without milk/creamer or sugar) is normal.   Stop the wellbutrin, and give it 4 weeks then decide if dry mouth is better, or mood is worse, and let me know.   Last shingles shot of your life today!   See me back in 6 months for a physical :)     CL        Attending and PA/NP shared services statement (NON-critical care): Attending and PA/NP shared services statement (NON-critical care):

## 2025-03-28 ENCOUNTER — APPOINTMENT (OUTPATIENT)
Dept: OTOLARYNGOLOGY | Facility: CLINIC | Age: 60
End: 2025-03-28
Payer: COMMERCIAL

## 2025-03-28 VITALS — WEIGHT: 155 LBS | BODY MASS INDEX: 28.52 KG/M2 | HEIGHT: 62 IN

## 2025-03-28 DIAGNOSIS — H93.8X2 SENSATION OF FULLNESS IN LEFT EAR: Primary | ICD-10-CM

## 2025-03-28 DIAGNOSIS — M43.6 NECK STIFFNESS: ICD-10-CM

## 2025-03-28 DIAGNOSIS — M26.609 TMJ DYSFUNCTION: ICD-10-CM

## 2025-03-28 PROCEDURE — 99213 OFFICE O/P EST LOW 20 MIN: CPT | Performed by: NURSE PRACTITIONER

## 2025-03-28 PROCEDURE — 3008F BODY MASS INDEX DOCD: CPT | Performed by: NURSE PRACTITIONER

## 2025-03-28 PROCEDURE — 1036F TOBACCO NON-USER: CPT | Performed by: NURSE PRACTITIONER

## 2025-03-28 NOTE — PROGRESS NOTES
Subjective   Patient ID: Bibiana Chatterjee is a 59 y.o. female who presents for fluid in ear.  HPI  This patient is referred for evaluation of a sensation of clogged left ear.  The patient is not accompanied by anyone.   When asked about ear pain, hearing loss, itching, discharge from ear, tinnitus, aural fullness or autophony, the patient admits to significant left aural fullness which has been ongoing for many years.  She last saw laryngology in January and was noted to have a left-sided effusion.  She has been taking Zyrtec-D and Flonase.  Today, she reports that the left fullness is currently resolved.  She endorses chronic neck stiffness.      Review of Systems  A comprehensive or 10 points review of the patient's constitutional, neurological, HEENT, pulmonary, cardiovascular and genito-urinary systems showed only those mentioned in history of present illness.    Objective   Physical Exam  Constitutional: no fever, chills, weight loss or weight gain   General appearance: Appears well, well-nourished, well groomed. No acute distress.   Communication: Normal communication   Psychiatric: Oriented to person, place and time. Normal mood and affect.   Neurologic: Cranial nerves II-XII grossly intact and symmetric bilaterally.   Head and Face:   Head: Atraumatic with no masses, lesions or scarring.   Face: Normal symmetry, no paralysis, synkinesis or facial tic. No scars or deformities.   TMJ: Bilateral tenderness  Eyes: Conjunctiva not edematous or erythematous   Ears: External inspection of ears with no deformity, scars or masses. Bilateral EACs clear and bilateral TMs intact with no signs of effusions.  Both ears were examined under the microscope.  Nose: External inspection of nose: No nasal lesions, lacerations or scars.   Neck: Normal appearing, symmetric, trachea midline.   Cardiovascular: Examination of peripheral vascular system shows no clubbing or cyanosis.   Respiratory: No respiratory distress increased work  of breathing. Inspection of the chest with symmetric chest expansion and normal respiratory effort.   Skin: No rashes in the head or neck    Assessment/Plan     This patient presents for subsequent evaluation of chronic acquired left aural fullness, TMJ dysfunction, neck stiffness.    Reassurance given that otologic exam today is normal.  We discussed that aural fullness can be from eustachian tube dysfunction and/or chronic TMJ dysfunction.  Since symptoms are currently not bothering her, I did not recommend getting an audiogram today.  I did recommend trying comfort measures for TMJ dysfunction as well as a referral to a private physical therapy practice that specializes in these issues.  If she has any acute otologic symptoms, I am happy to see her for an urgent visit.  All questions were answered to patient's satisfaction.    This note was created using speech recognition transcription software. Despite proofreading, several typographical errors might be present that might affect the meaning of the content. Please call with any questions.         FLORENCIO Raygoza-CNP 03/28/25 11:34 AM

## 2025-03-30 ENCOUNTER — PATIENT MESSAGE (OUTPATIENT)
Dept: OBSTETRICS AND GYNECOLOGY | Facility: CLINIC | Age: 60
End: 2025-03-30
Payer: COMMERCIAL

## 2025-03-30 ENCOUNTER — APPOINTMENT (OUTPATIENT)
Dept: URGENT CARE | Age: 60
End: 2025-03-30
Payer: COMMERCIAL

## 2025-03-30 DIAGNOSIS — N95.1 VAGINAL DRYNESS, MENOPAUSAL: ICD-10-CM

## 2025-04-01 ENCOUNTER — APPOINTMENT (OUTPATIENT)
Dept: ENDOCRINOLOGY | Facility: CLINIC | Age: 60
End: 2025-04-01
Payer: COMMERCIAL

## 2025-04-01 RX ORDER — ESTRADIOL 10 UG/1
10 TABLET, FILM COATED VAGINAL 2 TIMES WEEKLY
Qty: 8 TABLET | Refills: 3 | Status: SHIPPED | OUTPATIENT
Start: 2025-04-03

## 2025-04-02 ENCOUNTER — TELEPHONE (OUTPATIENT)
Dept: OBSTETRICS AND GYNECOLOGY | Facility: CLINIC | Age: 60
End: 2025-04-02
Payer: COMMERCIAL

## 2025-04-02 NOTE — TELEPHONE ENCOUNTER
Spoke to pharmacy, they stated Vagifem comes from a specialty pharmacy and it is delivered to them via [employer] or UPS, it is currently in transit and is expected to arrive on 4/4/2025.  Informed pt. Now she would like to know should she take it daily for two weeks since it's been a while since she has taken it or no.

## 2025-04-03 NOTE — TELEPHONE ENCOUNTER
Spoke to pt to inform her that she does not need to take Vagifem for two weeks, just twice a week. Also, coordinated a visit between her and Lucius with [facility] for 4/11/2025 at 1:30 pm.  Left a message with pt informing her of visit date and time.

## 2025-04-04 ENCOUNTER — APPOINTMENT (OUTPATIENT)
Dept: NUTRITION | Facility: CLINIC | Age: 60
End: 2025-04-04
Payer: COMMERCIAL

## 2025-04-27 DIAGNOSIS — N95.2 VAGINAL ATROPHY: Primary | ICD-10-CM

## 2025-04-27 DIAGNOSIS — N95.1 VAGINAL DRYNESS, MENOPAUSAL: ICD-10-CM

## 2025-04-29 RX ORDER — ESTRADIOL 10 UG/1
10 TABLET, FILM COATED VAGINAL 2 TIMES WEEKLY
Qty: 24 TABLET | Refills: 2 | Status: SHIPPED | OUTPATIENT
Start: 2025-05-01

## 2025-04-29 NOTE — TELEPHONE ENCOUNTER
Request received for   Requested Prescriptions     Pending Prescriptions Disp Refills    estradiol (Vagifem) 10 mcg tablet vaginal tablet 24 tablet 2     Sig: Insert 1 tablet (10 mcg) into the vagina 2 times a week.       Bibiana Chatterjee was last seen 4/2/2025

## 2025-05-06 DIAGNOSIS — E78.01 FAMILIAL HYPERCHOLESTEROLEMIA: ICD-10-CM

## 2025-05-06 RX ORDER — ATORVASTATIN CALCIUM 10 MG/1
10 TABLET, FILM COATED ORAL DAILY
Qty: 90 TABLET | Refills: 0 | Status: SHIPPED | OUTPATIENT
Start: 2025-05-06

## 2025-05-09 ENCOUNTER — HOSPITAL ENCOUNTER (OUTPATIENT)
Dept: RADIOLOGY | Facility: CLINIC | Age: 60
Discharge: HOME | End: 2025-05-09
Payer: COMMERCIAL

## 2025-05-09 ENCOUNTER — APPOINTMENT (OUTPATIENT)
Dept: GASTROENTEROLOGY | Facility: EXTERNAL LOCATION | Age: 60
End: 2025-05-09
Payer: COMMERCIAL

## 2025-05-09 DIAGNOSIS — K21.00 GASTROESOPHAGEAL REFLUX DISEASE WITH ESOPHAGITIS WITHOUT HEMORRHAGE: ICD-10-CM

## 2025-05-09 DIAGNOSIS — K21.9 GASTROESOPHAGEAL REFLUX DISEASE WITHOUT ESOPHAGITIS: ICD-10-CM

## 2025-05-09 DIAGNOSIS — R14.0 ABDOMINAL DISTENTION: ICD-10-CM

## 2025-05-09 DIAGNOSIS — R14.0 ABDOMINAL BLOATING: Primary | ICD-10-CM

## 2025-05-09 DIAGNOSIS — R14.0 ABDOMINAL BLOATING: ICD-10-CM

## 2025-05-09 DIAGNOSIS — K44.9 HIATAL HERNIA: ICD-10-CM

## 2025-05-09 DIAGNOSIS — K30 GASTROINTESTINAL DISCOMFORT: ICD-10-CM

## 2025-05-09 PROCEDURE — 43239 EGD BIOPSY SINGLE/MULTIPLE: CPT | Performed by: INTERNAL MEDICINE

## 2025-05-09 PROCEDURE — 74019 RADEX ABDOMEN 2 VIEWS: CPT

## 2025-05-09 RX ORDER — DEXLANSOPRAZOLE 60 MG/1
60 CAPSULE, DELAYED RELEASE ORAL DAILY
Qty: 90 CAPSULE | Refills: 3 | Status: SHIPPED | OUTPATIENT
Start: 2025-05-09 | End: 2026-05-09

## 2025-05-12 ENCOUNTER — LAB REQUISITION (OUTPATIENT)
Dept: LAB | Facility: HOSPITAL | Age: 60
End: 2025-05-12
Payer: COMMERCIAL

## 2025-05-13 ENCOUNTER — PATIENT MESSAGE (OUTPATIENT)
Dept: PRIMARY CARE | Facility: CLINIC | Age: 60
End: 2025-05-13
Payer: COMMERCIAL

## 2025-05-13 DIAGNOSIS — J30.2 SEASONAL ALLERGIES: ICD-10-CM

## 2025-05-13 DIAGNOSIS — F43.9 STRESS: Primary | ICD-10-CM

## 2025-05-14 RX ORDER — ESCITALOPRAM OXALATE 20 MG/1
20 TABLET ORAL DAILY
Qty: 30 TABLET | Refills: 5 | Status: SHIPPED | OUTPATIENT
Start: 2025-05-14 | End: 2025-11-10

## 2025-05-14 RX ORDER — FLUTICASONE PROPIONATE 50 MCG
1 SPRAY, SUSPENSION (ML) NASAL DAILY
Qty: 16 G | Refills: 11 | Status: SHIPPED | OUTPATIENT
Start: 2025-05-14 | End: 2026-05-14

## 2025-05-14 NOTE — PROGRESS NOTES
Nutrition Follow Up Assessment:     Patient Bibiana Chatterjee is a 59 y.o. female being seen via virtual visit  who was initially referred Safia Hernandez  on 7/2/24 for   1. Weight increasing        2. Dietary counseling and surveillance        3. Prediabetes          Last nutrition visit was completed on 1/24 with previous goals set by RDN & pt:   She will second-guess choices of food that are richer in calories/sugar, limiting number of times per week she chooses these     Virtual or Telephone Consent    An interactive audio and video telecommunication system which permits real time communications between the patient (at the originating site) and provider (at the distant site) was utilized to provide this telehealth service.   Verbal consent was requested and obtained from Bibiana Chatterjee on this date, 05/16/25 for a telehealth visit and the patient's location was confirmed at the time of the visit.    Nutrition Assessment      Nutrition History:  Food & Nutrition History: Her mother sent delicious baked goods during Passover because she couldn't physically visit. Bibiana enjoyed some of the Stella bread and enjoyed it thoughtfully while thinking of her mother. In the past month she's been back on track with regular healthy eating but despite good eating habits she has been gaining weight. She's traveling to [place] in a few weeks and intends to lose weight in preparation for the trip.  Food Intolerances: none  Fluid Intake: water, but is having bladder control issues so not drinking as much as she'd like to drink - she drinks more in the evening if she can't drink much  Energy Intake: Good > 75 %    Physical Activity:   Treadmill at least 4 days/week or more.      Anthropometrics:    Weight History:   Daily Weight  03/28/25 : 70.3 kg (155 lb)  03/20/25 : 69.6 kg (153 lb 6.4 oz)  03/18/25 : 69.6 kg (153 lb 6.4 oz)  01/28/25 : 72.5 kg (159 lb 12.8 oz)  01/07/25 : 73.2 kg (161 lb 6.4 oz)  11/05/24 : 73 kg (161  lb)  10/08/24 : 71.7 kg (158 lb)  09/24/24 : 70.3 kg (155 lb)  09/06/24 : 70.8 kg (156 lb)  08/29/24 : 71.6 kg (157 lb 12.8 oz)    Weight Change %:  Weight History / % Weight Change: weight had been down prior to Passover, she gained weight during Passover    Nutrition Focused Physical Exam Findings:  Defer due to virtual visit    Nutrition Significant Labs:  CBC Trend:   Recent Labs     03/20/25  1248 08/14/24  1051 10/20/23  1325 02/21/23  1011   WBC 5.3 5.7 6.4 5.6   NRBC  --  0.0 0.0 0.0   RBC 4.45 4.41 4.36 4.35   HGB 13.5 13.0 13.0 12.5   HCT 40.3 40.2 40.2 38.9   MCV 90.6 91 92 89   MCH 30.3 29.5 29.8  --    MCHC 33.5 32.3 32.3 32.1   RDW 12.1 12.4 12.1 13.1    317 328 317   , RFP + Serum Mag Trend:   Recent Labs     03/20/25  1249 08/14/24  1051 05/14/24  1035 03/05/24  1404 10/20/23  1325 02/21/23  1011 01/19/22  0840 01/07/22  1030   GLUCOSE 90 91 91 86   < > 92   < > 85    140 139 139   < > 138   < > 139   K 5.1 4.6 4.7 4.6   < > 5.5*   < > 4.2    105 105 103   < > 105   < > 103   CO2 26 29 27 28   < > 28   < > 30   ANIONGAP 8 11 12 13   < > 11   < > 10   BUN 15 14 9 17   < > 12   < > 11   CREATININE 0.79 0.88 0.88 0.74   < > 0.86   < > 0.84   EGFR 87 77 77 >90   < >  --   --   --    CALCIUM 10.7* 10.5 10.2 10.6   < > 10.4   < > 10.7*   PHOS  --   --   --  3.8  --  3.4  --  3.3   ALBUMIN 4.7 4.3 4.9 4.5   < > 4.5   < > 4.6  4.6    < > = values in this interval not displayed.   , LFT Trend:   Recent Labs     03/20/25  1249 08/14/24  1051 05/14/24  1035 01/19/22  0840 01/07/22  1030   ALBUMIN 4.7 4.3 4.9   < > 4.6  4.6   BILITOT 0.4 0.5 0.3   < > 0.5   BILIDIR  --   --   --   --  0.1   ALKPHOS 96 92 104   < > 120*   ALT 9 11 14   < > 11   AST 14 15 16   < > 14   PROT 7.0 6.7 6.9   < > 7.4    < > = values in this interval not displayed.   , DM Specific Labs Trend (Includes HgbA1C, antibodies & fasting insulin):   Recent Labs     08/14/24  1051 01/05/24  0913 02/01/19  1303   HGBA1C 5.6  "5.7* 5.8   , Lipid Panel Trend:    Recent Labs     03/20/25  1248 08/14/24  1051 10/20/23  1325 09/25/20  0950   CHOL 175 243* 241* 222*   HDL 74 65.1 79.1 79.2   LDLCALC 88 158* 151*  --    LDLF  --   --   --  131*   VLDL  --  20 11 12   TRIG 50 102 57 60   , Iron Panel + Serum Ferritin Trend:   Recent Labs     10/20/23  1325 02/21/23  1011 10/25/22  1636   IRON 100 51 70   UIBC 281  --   --    TIBC 381 423 459*   IRONSAT 26 12* 15*   FERRITIN  --  20  --    , Vitamin B12:   Lab Results   Component Value Date    FHULQMZL30 412 08/14/2024    , Folate: No results found for: \"FOLATE\" , and Vitamin D:   Lab Results   Component Value Date    VITD25 49 03/20/2025        Medications:  Current Outpatient Medications   Medication Instructions    atorvastatin (LIPITOR) 10 mg, oral, Daily    cetirizine (ZYRTEC) 10 mg, oral, Daily, as directed    dexlansoprazole (DEXILANT) 60 mg, oral, Daily, Do not crush or chew.    docusate sodium (STOOL SOFTENER ORAL) No dose, route, or frequency recorded.    escitalopram (LEXAPRO) 20 mg, oral, Daily    escitalopram (LEXAPRO) 20 mg, oral, Daily    estradiol (VAGIFEM) 10 mcg, vaginal, 2 times weekly    ferrous sulfate (IRON) 325 mg, Daily with breakfast    FLAXSEED OIL ORAL No dose, route, or frequency recorded.    fluticasone (Flonase) 50 mcg/actuation nasal spray 2 sprays, Daily    fluticasone (Flonase) 50 mcg/actuation nasal spray 2 sprays, Each Nostril, Daily, Shake gently. Before first use, prime pump. After use, clean tip and replace cap.    fluticasone (Flonase) 50 mcg/actuation nasal spray 1 spray, Each Nostril, Daily, Shake gently. Before first use, prime pump. After use, clean tip and replace cap.    fluticasone (Flonase) 50 mcg/actuation nasal spray 1 spray, Each Nostril, Daily, Shake gently. Before first use, prime pump. After use, clean tip and replace cap.    magnesium oxide (Mag-Ox) 400 mg tablet No dose, route, or frequency recorded.    NON FORMULARY VITAMIN D 1000 UNIT CAPS "      Nutrition Diagnosis   Malnutrition Diagnosis  Patient has Malnutrition Diagnosis: No    Nutrition Diagnosis  Patient has Nutrition Diagnosis: Yes  Diagnosis Status (1): New  Nutrition Diagnosis 1: Altered nutrition related to laboratory values  Related to (1): endocrine and metabolic dysfunction  As Evidenced by (1): HgA1c of 5.7%, high cholesterol (241) and high LDL (151) on 10/20/2023.  Additional Nutrition Diagnosis: Diagnosis 2  Diagnosis Status (2): New  Nutrition Diagnosis 2: Food and nutrition related knowledge deficit  Related to (2): lack of or limited prior nutrition-related education  As Evidenced by (2): she has questions about food/diet       Nutrition Interventions/Recommendations   Nutrition Prescription: Oral nutrition Kosher, vegetarian diet, reduced in calories for weight management    Nutrition Interventions:   Food and Nutrient Delivery: Meals & Snacks: Energy-modified diet  Goals: 1. Eat at least 3 times per day, 5-6 times small/frequent meals if this healps her stay more satisfied while reducing calorie intake for weight management  Other:: Physical activity  Goals: 2. Continue exercising at least 4 times per week     Coordination of Care: Collaboration and referral of nutrition care:  (N/A)     Nutrition Education:   Nutrition Education Content: Content related nutrition education      Educational Handouts Provided: N/A    Nutrition Counseling:   Nutrition Counseling Strategies : Nutrition counseling based on goal setting strategy    Readiness to Change : Good  Level of Understanding : Good  Anticipated Compliant : Good         Nutrition Monitoring and Evaluation   Food and Nutrient Intake  Monitoring and Evaluation Plan: Amount of food  Amount of Food: Estimated amout of food  Criteria: Portion control as method of calorie reduction    Knowledge Belief Attitude Determination   Monitoring and Evaluation Plan: Physical activity  Physical activity: Consistency  Criteria:  4/week    Anthropometric measurements  Monitoring and Evaluation Plan: Weight                   Follow Up: PRN she will reach out via email when she is feeling ready for another follow up    Time Spent  Prep time on day of patient encounter: 5 minutes  Time spent directly with patient, family or caregiver: 20 minutes  Additional Time Spent on Patient Care Activities: 0 minutes  Documentation Time: 7 minutes  Other Time Spent: 0 minutes  Total: 32 minutes

## 2025-05-16 ENCOUNTER — APPOINTMENT (OUTPATIENT)
Dept: NUTRITION | Facility: CLINIC | Age: 60
End: 2025-05-16
Payer: COMMERCIAL

## 2025-05-16 DIAGNOSIS — R73.03 PREDIABETES: ICD-10-CM

## 2025-05-16 DIAGNOSIS — Z71.3 DIETARY COUNSELING AND SURVEILLANCE: ICD-10-CM

## 2025-05-16 DIAGNOSIS — R63.5 WEIGHT INCREASING: Primary | ICD-10-CM

## 2025-05-16 PROCEDURE — 97803 MED NUTRITION INDIV SUBSEQ: CPT | Performed by: DIETITIAN, REGISTERED

## 2025-05-16 NOTE — PATIENT INSTRUCTIONS
Discussed that she may not change her body size much in the weeks leading up to her trip but staying the course with balanced eating habits could help her feel better about her weight and perhaps lose a few pounds. Encouraged continuing to focus on portion sizes for weight reduction, eating small/frequent meals if it helps her keep more satisfied and including high protein foods with meals or snacks.

## 2025-05-23 ENCOUNTER — TELEMEDICINE (OUTPATIENT)
Dept: OBSTETRICS AND GYNECOLOGY | Facility: CLINIC | Age: 60
End: 2025-05-23
Payer: COMMERCIAL

## 2025-05-23 ENCOUNTER — TELEPHONE (OUTPATIENT)
Dept: OBSTETRICS AND GYNECOLOGY | Facility: CLINIC | Age: 60
End: 2025-05-23

## 2025-05-23 DIAGNOSIS — N39.41 URGE URINARY INCONTINENCE: Primary | ICD-10-CM

## 2025-05-23 LAB
LABORATORY COMMENT REPORT: NORMAL
PATH REPORT.FINAL DX SPEC: NORMAL
PATH REPORT.GROSS SPEC: NORMAL
PATH REPORT.RELEVANT HX SPEC: NORMAL
PATH REPORT.TOTAL CANCER: NORMAL

## 2025-05-23 PROCEDURE — 99212 OFFICE O/P EST SF 10 MIN: CPT | Performed by: NURSE PRACTITIONER

## 2025-05-23 RX ORDER — TROSPIUM CHLORIDE 20 MG/1
20 TABLET, FILM COATED ORAL 2 TIMES DAILY PRN
Qty: 60 TABLET | Refills: 11 | Status: SHIPPED | OUTPATIENT
Start: 2025-05-23 | End: 2026-05-23

## 2025-05-23 NOTE — TELEPHONE ENCOUNTER
Pt is going out of the country to [place] next week and is concerned about how to deal with her urge incontinence. Added her on to Viviana's scheduled today for a virtual appt.

## 2025-05-23 NOTE — PROGRESS NOTES
Bibiana Chatterjee had a discussion of her overactive bladder virtually. She is having UUI and is going out of town.     HPI   - She reports experiencing significant urgency and incontinence, which has worsened recently.  - The patient is concerned about managing these symptoms while traveling to [place]. She is planning to return June 17th.     Interval History:  - She is trying to lose weight, but has recently had some difficulty with this.     Assesment and Plan  59 y.o. female being assessed for OAB/UUI. Co morbidities: Prediabetes, Anemia, Osteopenia.     Diagnoses:   #1 OAB/UUI    Plan:    1. OAB/UUI   - Sent rx for Trospium 20 mg BID; take it as needed on her upcoming travels. Side effects can include dry mouth or constipation. Other medications are delayed release and can take a few weeks before they become effective. There is an association with cognitive decline and anticholinergics if they are taken long term. Of note, Trospium does not cross the blood brain barrier so the risk of cognitive decline is believed to be lower.      Follow-up after June 17th (once pt returns from her travels) with DILIP Mejia.     Scribe Attestation:   I, Christy Ferris, am scribing for virtually, and in the presence of DILIP Mejia on 05/23/2025 at 2:33 PM.     SPEKE audio duration: 12 minutes    Phone call visit today: The patient's identity was confirmed and that she is located in [place]. DILIP Mejia identified herself and the patient consented to a telehealth visit today.     I spent a total of {eConsult Time:20642} in face to face and non face to face time.     DILIP Mejia

## 2025-07-01 ENCOUNTER — APPOINTMENT (OUTPATIENT)
Dept: PRIMARY CARE | Facility: CLINIC | Age: 60
End: 2025-07-01
Payer: COMMERCIAL

## 2025-07-01 ENCOUNTER — APPOINTMENT (OUTPATIENT)
Dept: OBSTETRICS AND GYNECOLOGY | Facility: CLINIC | Age: 60
End: 2025-07-01
Payer: COMMERCIAL

## 2025-07-01 VITALS
DIASTOLIC BLOOD PRESSURE: 67 MMHG | BODY MASS INDEX: 29.53 KG/M2 | TEMPERATURE: 97.4 F | HEART RATE: 68 BPM | OXYGEN SATURATION: 95 % | WEIGHT: 160.5 LBS | HEIGHT: 62 IN | SYSTOLIC BLOOD PRESSURE: 105 MMHG

## 2025-07-01 DIAGNOSIS — Z00.00 ROUTINE GENERAL MEDICAL EXAMINATION AT A HEALTH CARE FACILITY: Primary | ICD-10-CM

## 2025-07-01 DIAGNOSIS — M25.562 PAIN IN BOTH KNEES, UNSPECIFIED CHRONICITY: ICD-10-CM

## 2025-07-01 DIAGNOSIS — M25.561 PAIN IN BOTH KNEES, UNSPECIFIED CHRONICITY: ICD-10-CM

## 2025-07-01 PROCEDURE — G8433 SCR FOR DEP NOT CPT DOC RSN: HCPCS | Performed by: FAMILY MEDICINE

## 2025-07-01 PROCEDURE — 99396 PREV VISIT EST AGE 40-64: CPT | Performed by: FAMILY MEDICINE

## 2025-07-01 PROCEDURE — 3008F BODY MASS INDEX DOCD: CPT | Performed by: FAMILY MEDICINE

## 2025-07-01 ASSESSMENT — ENCOUNTER SYMPTOMS
OCCASIONAL FEELINGS OF UNSTEADINESS: 0
ARTHRALGIAS: 1
LOSS OF SENSATION IN FEET: 0
DEPRESSION: 0

## 2025-07-01 ASSESSMENT — PAIN SCALES - GENERAL: PAINLEVEL_OUTOF10: 0-NO PAIN

## 2025-07-01 ASSESSMENT — COLUMBIA-SUICIDE SEVERITY RATING SCALE - C-SSRS
6. HAVE YOU EVER DONE ANYTHING, STARTED TO DO ANYTHING, OR PREPARED TO DO ANYTHING TO END YOUR LIFE?: NO
1. IN THE PAST MONTH, HAVE YOU WISHED YOU WERE DEAD OR WISHED YOU COULD GO TO SLEEP AND NOT WAKE UP?: NO

## 2025-07-01 NOTE — PROGRESS NOTES
"Subjective   Patient ID: Bibiana Chatterjee is a 60 y.o. female who presents for physical.    HPI     Review of Systems   Musculoskeletal:  Positive for arthralgias.   All other systems reviewed and are negative.      Objective   /67   Pulse 68   Temp 36.3 °C (97.4 °F)   Ht 1.575 m (5' 2\")   Wt 72.8 kg (160 lb 8 oz)   LMP  (LMP Unknown)   SpO2 95%   BMI 29.36 kg/m²     Physical Exam  Constitutional:       Appearance: Normal appearance.   Musculoskeletal:      Comments: Crepitations of both knees         Assessment/Plan     This is a 60-year-old patient who is here for her annual well exam    She was concerned stating that she feels cold in both legs when she is getting to bed at night and was concerned for her circulation of her legs    On exam her pulses were normal and I reassured her    Also she was experiencing bilateral knee pain on exam she has crepitations of both knees I think she has patellofemoral dysfunction for which I advise reviewed all the medications quadriceps and hamstring exercises    I advised that that she needs to build muscles of the and lower as well as core muscles    She has a problem with bowel movements and I encouraged her to have regular time fixed for her to relax on the toilet around the same time to train her bowels to move around the same time    Encouraged her to wear sunglasses and sun protection    She has upcoming appointments with endocrinologist    Upcoming appointment with the gastroenterologist and also she is under the care of urogynecologist for incontinence    Follow up in 6 months         "

## 2025-07-01 NOTE — PATIENT INSTRUCTIONS
Make yourself accountable by writing down what you eat what you do for exercise so that you can do a more regular    Life can be hectic and when to many things are going follow-up to regular but please get up and move in the right direction yeah I I was moving in the right direction     Make sure you drink 64 ounces of water and the 3 cups of leafy green is throughout the day    Please make sure that you have good sun protection    Please rest sunglasses    Please make sure that you go to the bedroom also upper body and lower body that will help with your knees and future shoulder problems back problems    Make sure you have a good core strengthening exercise program    What I do is I would look at the [employer] --- is the you tuber

## 2025-07-08 ENCOUNTER — OFFICE VISIT (OUTPATIENT)
Dept: OBSTETRICS AND GYNECOLOGY | Facility: CLINIC | Age: 60
End: 2025-07-08
Payer: COMMERCIAL

## 2025-07-08 DIAGNOSIS — N39.41 URGE URINARY INCONTINENCE: Primary | ICD-10-CM

## 2025-07-08 PROCEDURE — 99215 OFFICE O/P EST HI 40 MIN: CPT | Performed by: NURSE PRACTITIONER

## 2025-07-08 PROCEDURE — 99215 OFFICE O/P EST HI 40 MIN: CPT | Mod: 25 | Performed by: NURSE PRACTITIONER

## 2025-07-08 PROCEDURE — 64566 NEUROELTRD STIM POST TIBIAL: CPT | Performed by: NURSE PRACTITIONER

## 2025-07-08 NOTE — PROGRESS NOTES
Subjective     History of Present Illness:     61 yo with MEL, osteopenia, overactive bladder,  GERD with history of esophagitis seen for follow up. Last seen 11/2024 to establish care, on dexilant 30 mg daily with good control of symptoms.Occasional dysphagia with soup. At that time on miralax most days with good bowel movements.  Contacted office 5/5 with abdominal bloating, distention requesting EGD.  This was done 5/9 showing  LA grade B esophagitis, small hiatal hernia, FGPs; gastric and duodenal bx negative. Dexilant increased to 60 mg daily.   On 6/10 pt sent message out of town progressively increased reflux, nausea, reduced appetite.    KUB prior to travel showed large amt of stool , recommended clean out and informed of such, then resume miralax.  May add famotidine 40 mg at bedtime if needed.  Today reports feeling fair. Frustrated with weight gain and as a result not feeling herself.  On dexilant 60 mg with feeling of 'bubbling' in throat in evenings. No heartburn per se or dysphagia, throat discomfort.  Feels need to belch but has never been able to.   Taking miralax with still irregular bowel movements, not daily, most days, stools softer. Feels bloated all the time.   Recently having hot flashes and seen by gynecology for this.         EGD 1/2023 (heartburn, n/v)- normal esophagus, gastric erythema, normal duodenum. Gastric polyps. Lower esophagus bx normal; FGP, normal gastric bx, normal duodenal bx.  Colonoscopy 2/2022 - 5mm HP polyp , normal TI, diverticulosis, hemorrhoids.  EGD 2/2022- salmon mucosa, biopsies of esophagus, 1cm hiatal hernia, normal duodenum. Bx nonspecific gastritis, FGP, lower esophagus SCJ with acute and chronic inflammation and reactive changes. Proximal esophagus, neg EoE.  EGD 2019- reflux esophagitis with esophageal ulceration , gastritis          Allergies  RX Allergies[1]    Medications  Current Outpatient Medications   Medication Instructions    atorvastatin (LIPITOR) 10  mg, oral, Daily    cetirizine (ZYRTEC) 10 mg, oral, Daily, as directed    dexlansoprazole (DEXILANT) 60 mg, oral, Daily, Do not crush or chew.    docusate sodium (STOOL SOFTENER ORAL) No dose, route, or frequency recorded.    escitalopram (LEXAPRO) 20 mg, oral, Daily    escitalopram (LEXAPRO) 20 mg, oral, Daily    estradiol (VAGIFEM) 10 mcg, vaginal, 2 times weekly    ferrous sulfate (IRON) 325 mg, Daily with breakfast    FLAXSEED OIL ORAL No dose, route, or frequency recorded.    fluticasone (Flonase) 50 mcg/actuation nasal spray 2 sprays, Daily    fluticasone (Flonase) 50 mcg/actuation nasal spray 2 sprays, Each Nostril, Daily, Shake gently. Before first use, prime pump. After use, clean tip and replace cap.    fluticasone (Flonase) 50 mcg/actuation nasal spray 1 spray, Each Nostril, Daily, Shake gently. Before first use, prime pump. After use, clean tip and replace cap.    fluticasone (Flonase) 50 mcg/actuation nasal spray 1 spray, Each Nostril, Daily, Shake gently. Before first use, prime pump. After use, clean tip and replace cap.    magnesium oxide (Mag-Ox) 400 mg tablet No dose, route, or frequency recorded.    NON FORMULARY VITAMIN D 1000 UNIT CAPS    trospium (SANCTURA) 20 mg, oral, 2 times daily PRN        Objective   There were no vitals taken for this visit.   Physical Exam  Cardiovascular:      Rate and Rhythm: Normal rate and regular rhythm.   Pulmonary:      Effort: Pulmonary effort is normal. No respiratory distress.      Breath sounds: Normal breath sounds. No wheezing.   Abdominal:      General: Bowel sounds are normal. There is no distension.      Palpations: Abdomen is soft. There is no mass.      Tenderness: There is no abdominal tenderness.   Neurological:      Mental Status: She is alert.               Assessment/Plan:      59yo with MEL, osteopenia, overactive bladder,  GERD with history of esophagitis seen for latter as well as bloating, irregular bowel movements. Sx of indigestion in  evening, possibly related to reflux, recommend adding famotidine 40 mg at bedtime/or in evening to see if improvement. Also with persistent irregularity of bowel movements with suspected constipation with large fecal load. Recommend KUB to evaluate if miralax has allowed adequate evacuation. If persistent buildup recommend clean out then may try stimulant laxative  vs prescription laxative.     Rec:  Start famotidine 40 mg at bedtime  Continue dexilant 60 mg daily  Check KUB, may need cleanout and alternate laxative  May consider testing for SIBO if no improvement  Follow up in 2-3 mo    Hope Rivera MD              [1]   Allergies  Allergen Reactions    House Dust Unknown    Iodinated Contrast Media Unknown    Molds Extract Unknown

## 2025-07-08 NOTE — PROGRESS NOTES
60 y.o. female presents for PTNS monthly maintenance session. Reports that Trospium was effective during travel, with no urinary urgency experienced for about a month. Notes she recently began experiencing urgency for the past 4-5 days. She has been taking Trospium only in the morning, occasionally takes it later in the day. Considers trying the Vivally home device if covered by her insurance. Experienced some constipation and upset stomach with Trospium, and nausea during the airplane ride. Concerned about long-term side effects of Trospium. Inquires about alternative OAB tx options.   Reports intense hot flashes, inquires about the effectiveness of Vagifem suppositories. She has a hx of HRT, but discontinued due to cancer concerns. Mentions Veozah was cost prohibitive. Interested in trying herbal remedies to manage hot flashes. Expresses frustration with inability to lose weight despite healthy eating habits. Considers intermittent fasting for weight management.       PERCUTANEOUS TIBIAL NERVE STIMULATION    Date/Time: 7/8/2025 11:10 AM    Performed by: DILIP Mejia  Authorized by: DILIP Mejia    Procedure Details     Indications: urge incontinence      PTNS session #: monthly maintenance    Bladder symptoms: improved      Ankle used: right      Stimulator intensity (mA): 10    Additional Details      Right foot and ankle cleaned with alcohol, grounding pad and needle placed without incident.        Assessment and Plan  60 y.o. female with UUI presents for PTNS monthly maintenance session. Comorbidities include: prediabetes, osteopenia.    Diagnosis List:  #1 UUI    Plan:  1. UUI  - PTNS monthly maintenance session performed on R ankle, level 10 for 30 minutes. Procedure tolerated well without immediate complications.   - Patient plans to contact [facility] for more information on Vivally home device.   - Discussed consideration of alternative treatments such as Myrbetriq and Gemtesa,  which are beta-agonists with different side effect profiles.   - We briefly discussed third-line therapies we offer for OAB including intradetrusor Botox injection in which we inject Botox to the muscle the controls the bladder to allow it to relax to provider OAB sx relief for up to 3-12 months but there is a possibility of urinary retention in which she would need to be amenable to learning how to CIC until the retention resolves over time as the effects of Botox wears off.    - Discussed the use of Effexor for hot flashes, noting potential side effects such as weight changes and mood alterations.  - Patient is interested in exploring herbal remedies such as curcumin, turmeric, and ashwagandha. Provided a handout with information on dosages and expected time to effect.  - Discussed the potential benefits of intermittent fasting and the importance of meal timing.  - Encouraged the patient to consider standing or walking after meals to aid metabolism.      Follow up in 1 week with DILIP Mejia.      Scribe Attestation  By signing my name below, I, Ivonne Lopez, attest that this documentation has been prepared under the direction and in the presence of DILIP Mejia on 07/08/2025 at 2:13 PM.     SPEKE audio duration: 39 minutes    I spent a total of 45 minutes in face to face and non face to face time.     Agree with above. I Viviana MATIAS personally performed the services described in the documentation which was scribed virtually and confirm it is both complete and accurate.  Viviana MATIAS

## 2025-07-09 ENCOUNTER — HOSPITAL ENCOUNTER (OUTPATIENT)
Dept: RADIOLOGY | Facility: CLINIC | Age: 60
Discharge: HOME | End: 2025-07-09
Payer: COMMERCIAL

## 2025-07-09 ENCOUNTER — APPOINTMENT (OUTPATIENT)
Dept: GASTROENTEROLOGY | Facility: CLINIC | Age: 60
End: 2025-07-09
Payer: COMMERCIAL

## 2025-07-09 VITALS — HEIGHT: 62 IN | WEIGHT: 158 LBS | BODY MASS INDEX: 29.08 KG/M2

## 2025-07-09 DIAGNOSIS — R14.0 ABDOMINAL BLOATING: Primary | ICD-10-CM

## 2025-07-09 DIAGNOSIS — K59.00 CONSTIPATION, UNSPECIFIED CONSTIPATION TYPE: ICD-10-CM

## 2025-07-09 DIAGNOSIS — R14.0 ABDOMINAL BLOATING: ICD-10-CM

## 2025-07-09 PROCEDURE — 74019 RADEX ABDOMEN 2 VIEWS: CPT | Performed by: RADIOLOGY

## 2025-07-09 PROCEDURE — 74019 RADEX ABDOMEN 2 VIEWS: CPT

## 2025-07-09 PROCEDURE — 3008F BODY MASS INDEX DOCD: CPT | Performed by: INTERNAL MEDICINE

## 2025-07-09 PROCEDURE — 99214 OFFICE O/P EST MOD 30 MIN: CPT | Performed by: INTERNAL MEDICINE

## 2025-07-11 DIAGNOSIS — K59.04 CHRONIC IDIOPATHIC CONSTIPATION: Primary | ICD-10-CM

## 2025-07-17 ENCOUNTER — TELEPHONE (OUTPATIENT)
Dept: OBSTETRICS AND GYNECOLOGY | Facility: CLINIC | Age: 60
End: 2025-07-17
Payer: COMMERCIAL

## 2025-07-17 ENCOUNTER — APPOINTMENT (OUTPATIENT)
Dept: OBSTETRICS AND GYNECOLOGY | Facility: CLINIC | Age: 60
End: 2025-07-17
Payer: COMMERCIAL

## 2025-07-17 NOTE — TELEPHONE ENCOUNTER
Pt stated that the pharmacy has not gotten back to her about the medication trospium and she has a surgery/procedure on Monday 7/21/25. She stated that she needs the medication before her procedure/surgery.

## 2025-08-02 ENCOUNTER — TELEMEDICINE (OUTPATIENT)
Dept: PRIMARY CARE | Facility: CLINIC | Age: 60
End: 2025-08-02
Payer: COMMERCIAL

## 2025-08-02 DIAGNOSIS — H10.023 PINK EYE DISEASE OF BOTH EYES: Primary | ICD-10-CM

## 2025-08-02 PROCEDURE — 99213 OFFICE O/P EST LOW 20 MIN: CPT | Performed by: FAMILY MEDICINE

## 2025-08-02 PROCEDURE — 1036F TOBACCO NON-USER: CPT | Performed by: FAMILY MEDICINE

## 2025-08-02 RX ORDER — POLYMYXIN B SULFATE AND TRIMETHOPRIM 1; 10000 MG/ML; [USP'U]/ML
1 SOLUTION OPHTHALMIC EVERY 4 HOURS
Qty: 10 ML | Refills: 0 | Status: SHIPPED | OUTPATIENT
Start: 2025-08-02 | End: 2025-08-09

## 2025-08-02 ASSESSMENT — ENCOUNTER SYMPTOMS
COUGH: 0
DOUBLE VISION: 0
RHINORRHEA: 0
EYE DISCHARGE: 1
EYE REDNESS: 1
EYE PAIN: 0
APPETITE CHANGE: 0
FEVER: 0
MYALGIAS: 0
SINUS PRESSURE: 0
EYE ITCHING: 1
VOICE CHANGE: 0
HEADACHES: 0
FATIGUE: 0
ACTIVITY CHANGE: 0
SORE THROAT: 0
PHOTOPHOBIA: 0
CHILLS: 0

## 2025-08-02 NOTE — PROGRESS NOTES
Past Medical History


Past Medical History:  No Pertinent History


Past Surgical History:  Other


Additional Past Surgical Histo:  cyst removed from thyroid


Smoking Status:  Never Smoker


Alcohol Use:  None


Drug Use:  None





General Adult


EDM:


Chief Complaint:  ANKLE PROBLEM





HPI:


HPI:





Patient is a 16  year old [f__sex] who presents with []





Review of Systems:


Review of Systems:


Constitutional:   Denies fever or chills. []


Eyes:   Denies change in visual acuity. []


HENT:   Denies nasal congestion or sore throat. [] 


Respiratory:   Denies cough or shortness of breath. [] 


Cardiovascular:   Denies chest pain or edema. [] 


GI:   Denies abdominal pain, nausea, vomiting, bloody stools or diarrhea. [] 


:  Denies dysuria. [] 


Musculoskeletal:   Denies back pain or joint pain. [] 


Integument:   Denies rash. [] 


Neurologic:   Denies headache, focal weakness or sensory changes. [] 


Endocrine:   Denies polyuria or polydipsia. [] 


Lymphatic:  Denies swollen glands. [] 


Psychiatric:  Denies depression or anxiety. []





Heart Score:


Risk Factors:


Risk Factors:  DM, Current or recent (<one month) smoker, HTN, HLP, family h

istory of CAD, obesity.


Risk Scores:


Score 0 - 3:  2.5% MACE over next 6 weeks - Discharge Home


Score 4 - 6:  20.3% MACE over next 6 weeks - Admit for Clinical Observation


Score 7 - 10:  72.7% MACE over next 6 weeks - Early Invasive Strategies





Allergies:


Allergies:





Allergies








Coded Allergies Type Severity Reaction Last Updated Verified


 


  No Known Drug Allergies    16 No











Physical Exam:


PE:





Constitutional: Well developed, well nourished, no acute distress, non-toxic 

appearance. []


HENT: Normocephalic, atraumatic, bilateral external ears normal, oropharynx 

moist, no oral exudates, nose normal. []


Eyes: PERRLA, EOMI, conjunctiva normal, no discharge. [] 


Neck: Normal range of motion, no tenderness, supple, no stridor. [] 


Cardiovascular:Heart rate regular rhythm, no murmur []


Lungs & Thorax:  Bilateral breath sounds clear to auscultation []


Abdomen: Bowel sounds normal, soft, no tenderness, no masses, no pulsatile 

masses. [] 


Skin: Warm, dry, no erythema, no rash. [] 


Back: No tenderness, no CVA tenderness. [] 


Extremities: No tenderness, no cyanosis, no clubbing, ROM intact, no edema. [] 


Neurologic: Alert and oriented X 3, normal motor function, normal sensory func

tion, no focal deficits noted. []


Psychologic: Affect normal, judgement normal, mood normal. []





EKG:


EKG:


[]





Radiology/Procedures:


Radiology/Procedures:


[]IMAGING REPORT





                                     Signed





PATIENT: SANAZ STAPLES    ACCOUNT: [id number]     MRN#: [id number]


: 2004           LOCATION: ER              AGE: 16


SEX: F                    EXAM DT: 20         ACCESSION#: [id number]


STATUS: REG ER            ORD. PHYSICIAN: EDWARD DICK DO


REASON: left ankle pain, twisted ankle yesterday


PROCEDURE: ANKLE LEFT 3V





EXAM: Left ankle, 3 views.


 


HISTORY: Twisting injury.


 


COMPARISON: None.


 


FINDINGS: 3 views of the left ankle are obtained. There is no fracture, 


dislocation or subluxation. The ankle mortise is intact. There is no 


osteochondral lesion.


 


IMPRESSION: No acute osseous finding.


 


Electronically signed by: Aisha Leong MD (2020 9:34 AM) [id number]














DICTATED and SIGNED BY:     AISHA LEONG MD


DATE:     20 0934





Course & Med Decision Making:


Course & Med Decision Making


Pertinent Labs and Imaging studies reviewed. (See chart for details)





[]





Dragon Disclaimer:


Dragon Disclaimer:


This electronic medical record was generated, in whole or in part, using a voice

 recognition dictation system.





Departure


Departure


Impression:  


   Primary Impression:  


   High ankle sprain


Disposition:  01 DC HOME SELF CARE/HOMELESS


Condition:  STABLE


Referrals:  


UNKNOWN PCP NAME (PCP)


FOLLOW UP WITH FAMILY MEDICINE:





Family Medicine


Address:


[address] [place] [id number]


Phone:


[phone]


Patient Instructions:  Ankle Sprain, RICE - Routine Care for Injuries





Additional Instructions:  


[facility]opedic [facility] 


Fracture Clinic


Call for appointment, [phone]





EMERGENCY DEPARTMENT GENERAL DISCHARGE INSTRUCTIONS





Thank you for coming to [facility] Emergency Department (ED) 

today and 


trusting us with you care.  We trust that you had a positive experience in our 

Emergency 


Department.  If you wish to speak to the department management, you may call the

 Director at 


[phone].





YOUR FOLLOW UP INSTRUCTIONS ARE AS FOLLOWS:





1.  Do you have a private Doctor?  If you do not have a private doctor, please 

ask for a 


resource list of physicians or clinics that may be able to assist you with 

follow up care.





2.  The Emergency Physicain has interpreted your x-rays.  The X-Ray specialist 

will also 


review them.  If there is a change in the findings, you will be notified in 48 

hours when at 


all possible.





3.  A lab test or culture has been done, your results will be reviewed and you 

will be 


notified if you need a change in treatment.





ADDITIONAL INSTRUCTIONS AND INFORMATION:





1.  Your care today has been supervised by a physician who is specially trained 

in emergency 


care.  Many problems require more than one evaluation for a complete diagnosis 

and 


treatment.  We recommend that you schedule your follow up appointment as 

recommended to 


ensure complete treatment of you illness or injury.  If you are unable to obtain

 follow up 


care and continue to have a problem, or if your condition worsens, we recommend 

that you 


return to the ED.





2.  We are not able to safely determine your condition over the phone nor are we

 able to 


give sound medical advice over the phone.  For these safety reasons, if you call

 for medical 


advice we will ask you to come to the ED for further evaluation.





3.  If you have any questions regarding these discharge instructions please call

 the ED at 


[phone].





SAFETY INFORMATION:





In the interest of safety, wellness, and injury prevention; we encourage you to 

wear your 


sealbelt, if you smoke; quite smoking, and we encourage family to use a 

protective helmet 


for bicycling and other sporting events that present an increased risk for head 

injury.





IF YOUR SYMPTOMS WORSEN OR NEW SYMPTOMS DEVELOP, OR YOU HAVE CONCERNS ABOUT YOUR

 CONDITION; 


OR IF YOUR CONDITION WORSENS WHILE YOU ARE WAITING FOR YOUR FOLLOW UP 

APPOINTMENT; EITHER 


CONTACT YOUR PRIMARY CARE DOCTOR, THE PHYSICIAN WHOSE NAME AND NUMBER YOU WERE 

GIVEN, OR 


RETURN TO THE ED IMMEDIATELY.











EDWARD WRIGHT DO                2020 09:52 Subjective   Patient ID: Bibiana Chatterjee is a 60 y.o. female who presents for eye irritation    An interactive audio and video telecommunication system which permits real time communications between the patient (at the originating site) and provider (at the distant site) was utilized to provide this telehealth service. At the start of the visit, I introduced myself as the nurse practitioner for their visit today, Tennille AREVALO. I then verified the patients name, , and current physical location. Patient confirmed their current location was IN THE [facility]. If they were currently outside of the [facility], the call was terminated and the patient was directed to alternative means of care. The patient was made aware of the limitations of the telehealth visit. They will not be physically examined and all issues may not be appropriate for a telehealth visit. If at any time this provider felt the visit was not appropriate for a video visit or more advanced care was necessary, the call would be terminated and the patient would be advised to seek proper, in person, medical attention. Pt verbalizes understanding and agrees to continue with televisit.     Pt presents with c/o itching, watering, swollen eyes with crusting and drainage. Started 2 days ago with right eye. Worsening yesterday. Woke this morning with left eye discharge as well. No known sick contacts. Has been applying warm compresses. Using same wash cloth. No contact use.     Conjunctivitis   The current episode started 3 to 5 days ago. The onset was gradual. The problem has been gradually worsening. Nothing relieves the symptoms. Nothing aggravates the symptoms. Associated symptoms include eye itching, eye discharge and eye redness. Pertinent negatives include no fever, no decreased vision, no double vision, no photophobia, no congestion, no ear pain, no headaches, no hearing loss, no rhinorrhea, no sore throat, no muscle aches, no cough, no rash  and no eye pain.        Review of Systems   Constitutional:  Negative for activity change, appetite change, chills, fatigue and fever.   HENT:  Negative for congestion, ear pain, hearing loss, rhinorrhea, sinus pressure, sore throat and voice change.    Eyes:  Positive for discharge, redness and itching. Negative for double vision, photophobia, pain and visual disturbance.   Respiratory:  Negative for cough.    Musculoskeletal:  Negative for myalgias.   Skin:  Negative for rash.   Neurological:  Negative for headaches.       Objective   LMP  (LMP Unknown)     Physical Exam  Vitals and nursing note reviewed.   Constitutional:       General: She is not in acute distress.    Eyes:      Conjunctiva/sclera: Conjunctivae normal.       Neurological:      General: No focal deficit present.      Mental Status: She is alert. Mental status is at baseline.     Psychiatric:         Mood and Affect: Mood normal.         Behavior: Behavior normal.         Thought Content: Thought content normal.         Judgment: Judgment normal.       Physical exam limited d/t video visit.   Assessment/Plan   Diagnoses and all orders for this visit:  Pink eye disease of both eyes  -     polymyxin B sulf-trimethoprim (Polytrim) ophthalmic solution; Administer 1 drop into both eyes every 4 hours for 7 days.  Patient counseled on hand hygiene and to avoid touching eyes, shaking hands, sharing towels/pillows, and touching shared public spaces.                         Cool compresses 4x/day.  Bacterial conjunctivitis:  Begin antibiotic eye medication as prescribed. Discussed indications for use, administration directions, and adverse effects to monitor.     Follow-up in 1-2 weeks or sooner as needed       The total time spent on this visit was 20 min. This time includes, but is not limited to, reviewing the patient's history, labs, test results, allergies, current medications, interviewing, assessing, examining, diagnosing, counseling, education,  placing orders, documenting and care coordination.

## 2025-08-05 ENCOUNTER — TELEPHONE (OUTPATIENT)
Dept: OBSTETRICS AND GYNECOLOGY | Facility: CLINIC | Age: 60
End: 2025-08-05
Payer: COMMERCIAL

## 2025-08-07 ENCOUNTER — APPOINTMENT (OUTPATIENT)
Dept: ENDOCRINOLOGY | Facility: CLINIC | Age: 60
End: 2025-08-07
Payer: COMMERCIAL

## 2025-08-07 ENCOUNTER — APPOINTMENT (OUTPATIENT)
Dept: OBSTETRICS AND GYNECOLOGY | Facility: CLINIC | Age: 60
End: 2025-08-07
Payer: COMMERCIAL

## 2025-08-07 VITALS
RESPIRATION RATE: 16 BRPM | WEIGHT: 159.2 LBS | BODY MASS INDEX: 29.3 KG/M2 | SYSTOLIC BLOOD PRESSURE: 120 MMHG | DIASTOLIC BLOOD PRESSURE: 78 MMHG | HEART RATE: 83 BPM | HEIGHT: 62 IN

## 2025-08-07 DIAGNOSIS — E83.52 HYPERCALCEMIA: Primary | ICD-10-CM

## 2025-08-07 DIAGNOSIS — E55.9 VITAMIN D DEFICIENCY: ICD-10-CM

## 2025-08-07 DIAGNOSIS — M85.80 OSTEOPENIA, UNSPECIFIED LOCATION: ICD-10-CM

## 2025-08-07 PROBLEM — G47.00 INSOMNIA: Status: RESOLVED | Noted: 2023-03-08 | Resolved: 2025-08-07

## 2025-08-07 PROBLEM — N95.1 PERIMENOPAUSE: Status: RESOLVED | Noted: 2024-08-13 | Resolved: 2025-08-07

## 2025-08-07 PROBLEM — R73.03 PREDIABETES: Status: RESOLVED | Noted: 2023-03-08 | Resolved: 2025-08-07

## 2025-08-07 PROBLEM — H00.015 HORDEOLUM EXTERNUM OF LEFT LOWER EYELID: Status: RESOLVED | Noted: 2023-03-08 | Resolved: 2025-08-07

## 2025-08-07 PROBLEM — R29.898 DISORDER OF HAND: Status: RESOLVED | Noted: 2023-03-08 | Resolved: 2025-08-07

## 2025-08-07 PROBLEM — N90.89 MASS OF VULVA: Status: RESOLVED | Noted: 2023-03-08 | Resolved: 2025-08-07

## 2025-08-07 PROBLEM — N90.7 LABIAL CYST: Status: RESOLVED | Noted: 2023-03-08 | Resolved: 2025-08-07

## 2025-08-07 PROBLEM — F41.9 ANXIETY: Status: RESOLVED | Noted: 2023-03-08 | Resolved: 2025-08-07

## 2025-08-07 PROCEDURE — 99214 OFFICE O/P EST MOD 30 MIN: CPT | Performed by: INTERNAL MEDICINE

## 2025-08-07 PROCEDURE — 1036F TOBACCO NON-USER: CPT | Performed by: INTERNAL MEDICINE

## 2025-08-07 PROCEDURE — 3008F BODY MASS INDEX DOCD: CPT | Performed by: INTERNAL MEDICINE

## 2025-08-07 ASSESSMENT — ENCOUNTER SYMPTOMS
NAUSEA: 0
SHORTNESS OF BREATH: 0
PALPITATIONS: 0
LOSS OF SENSATION IN FEET: 0
DIARRHEA: 0
VOMITING: 0
CHILLS: 0
FEVER: 0
HEADACHES: 0
OCCASIONAL FEELINGS OF UNSTEADINESS: 0
FATIGUE: 0
DEPRESSION: 0
COUGH: 0

## 2025-08-07 ASSESSMENT — PAIN SCALES - GENERAL: PAINLEVEL_OUTOF10: 0-NO PAIN

## 2025-08-07 NOTE — PROGRESS NOTES
Endocrinology: Follow up visit  Subjective   Patient ID: Bibiana Chatterjee is a 60 y.o. female who presents for Abnormal Calcium, Hyperparathyroidism, and Vitamin D Deficiency.    PCP: Charis Torres MD    HPI  Here for follow up mild primary hyperpara  D def  Osteopenia: 2024 dexa stable     Since last visit doing fine except for recent conjunctivitis    Calcium in march up slighlty with increased pth  Last urine test 2022 normal    Review of Systems   Constitutional:  Negative for chills, fatigue and fever.   Respiratory:  Negative for cough and shortness of breath.    Cardiovascular:  Negative for chest pain and palpitations.   Gastrointestinal:  Negative for diarrhea, nausea and vomiting.   Neurological:  Negative for headaches.       Problem List[1]     Home Meds:  Current Outpatient Medications   Medication Instructions    atorvastatin (LIPITOR) 10 mg, oral, Daily    cetirizine (ZYRTEC) 10 mg, oral, Daily, as directed    dexlansoprazole (DEXILANT) 60 mg, oral, Daily, Do not crush or chew.    docusate sodium (STOOL SOFTENER ORAL) No dose, route, or frequency recorded.    escitalopram (LEXAPRO) 20 mg, oral, Daily    escitalopram (LEXAPRO) 20 mg, oral, Daily    estradiol (VAGIFEM) 10 mcg, vaginal, 2 times weekly    ferrous sulfate (IRON) 325 mg, Daily with breakfast    FLAXSEED OIL ORAL No dose, route, or frequency recorded.    fluticasone (Flonase) 50 mcg/actuation nasal spray 2 sprays, Daily    fluticasone (Flonase) 50 mcg/actuation nasal spray 2 sprays, Each Nostril, Daily, Shake gently. Before first use, prime pump. After use, clean tip and replace cap.    fluticasone (Flonase) 50 mcg/actuation nasal spray 1 spray, Each Nostril, Daily, Shake gently. Before first use, prime pump. After use, clean tip and replace cap.    fluticasone (Flonase) 50 mcg/actuation nasal spray 1 spray, Each Nostril, Daily, Shake gently. Before first use, prime pump. After use, clean tip and replace cap.    linaCLOtide (LINZESS)  "72 mcg, oral, Daily before breakfast, Do not crush or chew. Take 30 minutes before breakfast.    magnesium oxide (Mag-Ox) 400 mg tablet No dose, route, or frequency recorded.    NON FORMULARY VITAMIN D 1000 UNIT CAPS    polymyxin B sulf-trimethoprim (Polytrim) ophthalmic solution 1 drop, Both Eyes, Every 4 hours    trospium (SANCTURA) 20 mg, oral, 2 times daily PRN        RX Allergies[2]     Objective   Vitals:    08/07/25 1403   BP: 120/78   Pulse: 83   Resp: 16      Vitals:    08/07/25 1403   Weight: 72.2 kg (159 lb 3.2 oz)      Body mass index is 29.12 kg/m².   Physical Exam  Constitutional:       Appearance: Normal appearance. She is overweight.   HENT:      Head: Normocephalic and atraumatic.   Neck:      Thyroid: No thyroid mass, thyromegaly or thyroid tenderness.     Cardiovascular:      Rate and Rhythm: Normal rate and regular rhythm.      Heart sounds: No murmur heard.     No gallop.   Pulmonary:      Effort: Pulmonary effort is normal.      Breath sounds: Normal breath sounds.   Abdominal:      Palpations: Abdomen is soft.      Comments: benign     Neurological:      General: No focal deficit present.      Mental Status: She is alert and oriented to person, place, and time.      Deep Tendon Reflexes: Reflexes are normal and symmetric.     Psychiatric:         Behavior: Behavior is cooperative.         Labs:  Lab Results   Component Value Date    HGBA1C 5.6 08/14/2024    TSH 1.29 03/20/2025      No results found for: \"PR1\", \"THYROIDPAB\", \"TSI\"     Assessment/Plan   Assessment & Plan  Osteopenia, unspecified location    Dexa in 2024 stable  Repeat in one year  Hypercalcemia    Calcium up slightly: recommend recheck of urine testing  Vitamin D deficiency    D normal on current supplement      Electronically signed by:  Kezia Hernandez MD 08/07/25 2:15 PM                   [1]   Patient Active Problem List  Diagnosis    Cervical pain    Chronic neck pain    Serum calcium elevated    Hypercalcemia    Hot flashes, " menopausal    Globus sensation    Generalized anxiety disorder    Ovarian cyst    Muscle spasm    Lesion of liver    Knee pain    Iron deficiency anemia    Left ankle pain    Pain, foot, chronic, unspecified laterality    Peptic ulcer    Pollen-food allergy    Allergic rhinitis due to dust    Allergic rhinitis due to pollen    Seasonal allergies    Segmental and somatic dysfunction    Stress incontinence in female    Otalgia    Sensation of fullness in left ear    Tinnitus    Vitamin D deficiency    Osteopenia    Dietary counseling and surveillance    Follicle cyst    Atypical squamous cell changes of undetermined significance (ASCUS) on cervical cytology with negative high risk human papilloma virus (HPV) test result    Chronic constipation    Presbyopia    Keratoconjunctivitis sicca of both eyes not specified as Sjogren's    Calcaneal bursitis (heel)    Weight increasing    Allergic conjunctivitis of both eyes   [2]   Allergies  Allergen Reactions    House Dust Unknown    Iodinated Contrast Media Unknown    Molds Extract Unknown

## 2025-08-10 DIAGNOSIS — E78.01 FAMILIAL HYPERCHOLESTEROLEMIA: ICD-10-CM

## 2025-08-12 RX ORDER — ATORVASTATIN CALCIUM 10 MG/1
10 TABLET, FILM COATED ORAL DAILY
Qty: 90 TABLET | Refills: 0 | Status: SHIPPED | OUTPATIENT
Start: 2025-08-12

## 2025-08-14 ENCOUNTER — OFFICE VISIT (OUTPATIENT)
Dept: OBSTETRICS AND GYNECOLOGY | Facility: CLINIC | Age: 60
End: 2025-08-14
Payer: COMMERCIAL

## 2025-08-14 VITALS — SYSTOLIC BLOOD PRESSURE: 111 MMHG | DIASTOLIC BLOOD PRESSURE: 76 MMHG | HEART RATE: 79 BPM

## 2025-08-14 DIAGNOSIS — N39.41 URGE URINARY INCONTINENCE: Primary | ICD-10-CM

## 2025-08-14 DIAGNOSIS — N39.41 URGE URINARY INCONTINENCE: ICD-10-CM

## 2025-08-14 PROCEDURE — 99214 OFFICE O/P EST MOD 30 MIN: CPT | Mod: 25 | Performed by: NURSE PRACTITIONER

## 2025-08-14 PROCEDURE — 64566 NEUROELTRD STIM POST TIBIAL: CPT | Performed by: NURSE PRACTITIONER

## 2025-08-14 PROCEDURE — 99214 OFFICE O/P EST MOD 30 MIN: CPT | Performed by: NURSE PRACTITIONER

## 2025-08-14 RX ORDER — VIBEGRON 75 MG/1
75 TABLET, FILM COATED ORAL DAILY
Qty: 30 TABLET | Refills: 11 | Status: SHIPPED | OUTPATIENT
Start: 2025-08-14 | End: 2025-08-15

## 2025-08-15 RX ORDER — MIRABEGRON 25 MG/1
25 TABLET, FILM COATED, EXTENDED RELEASE ORAL DAILY
Qty: 30 TABLET | Refills: 2 | Status: SHIPPED | OUTPATIENT
Start: 2025-08-15

## 2025-08-18 ENCOUNTER — TELEPHONE (OUTPATIENT)
Dept: OBSTETRICS AND GYNECOLOGY | Facility: CLINIC | Age: 60
End: 2025-08-18
Payer: COMMERCIAL

## 2025-08-19 ENCOUNTER — OFFICE VISIT (OUTPATIENT)
Dept: OBSTETRICS AND GYNECOLOGY | Facility: CLINIC | Age: 60
End: 2025-08-19
Payer: COMMERCIAL

## 2025-08-19 DIAGNOSIS — N39.41 URGE URINARY INCONTINENCE: Primary | ICD-10-CM

## 2025-08-19 PROCEDURE — 64566 NEUROELTRD STIM POST TIBIAL: CPT | Performed by: NURSE PRACTITIONER

## 2025-08-19 PROCEDURE — 99214 OFFICE O/P EST MOD 30 MIN: CPT | Mod: 25 | Performed by: NURSE PRACTITIONER

## 2025-08-19 PROCEDURE — 99214 OFFICE O/P EST MOD 30 MIN: CPT | Performed by: NURSE PRACTITIONER

## 2025-08-25 ENCOUNTER — OFFICE VISIT (OUTPATIENT)
Dept: OBSTETRICS AND GYNECOLOGY | Facility: CLINIC | Age: 60
End: 2025-08-25
Payer: COMMERCIAL

## 2025-08-25 DIAGNOSIS — N39.41 URGE URINARY INCONTINENCE: Primary | ICD-10-CM

## 2025-08-25 PROCEDURE — 64566 NEUROELTRD STIM POST TIBIAL: CPT | Performed by: NURSE PRACTITIONER

## 2025-08-25 PROCEDURE — 99214 OFFICE O/P EST MOD 30 MIN: CPT | Performed by: NURSE PRACTITIONER

## 2025-09-04 ENCOUNTER — APPOINTMENT (OUTPATIENT)
Dept: OBSTETRICS AND GYNECOLOGY | Facility: CLINIC | Age: 60
End: 2025-09-04
Payer: COMMERCIAL

## 2025-10-07 ENCOUNTER — APPOINTMENT (OUTPATIENT)
Dept: INTEGRATIVE MEDICINE | Facility: CLINIC | Age: 60
End: 2025-10-07
Payer: COMMERCIAL

## 2025-12-31 ENCOUNTER — APPOINTMENT (OUTPATIENT)
Dept: INTEGRATIVE MEDICINE | Facility: CLINIC | Age: 60
End: 2025-12-31
Payer: COMMERCIAL

## 2026-01-06 ENCOUNTER — APPOINTMENT (OUTPATIENT)
Dept: PRIMARY CARE | Facility: CLINIC | Age: 61
End: 2026-01-06
Payer: COMMERCIAL